# Patient Record
Sex: FEMALE | Race: ASIAN | NOT HISPANIC OR LATINO | Employment: FULL TIME | URBAN - METROPOLITAN AREA
[De-identification: names, ages, dates, MRNs, and addresses within clinical notes are randomized per-mention and may not be internally consistent; named-entity substitution may affect disease eponyms.]

---

## 2016-06-08 LAB
EXTERNAL HEPATITIS B SURFACE ANTIGEN: NEGATIVE
EXTERNAL RUBELLA IGG QUANTITATION: NORMAL
EXTERNAL SYPHILIS RPR SCREEN: NORMAL

## 2017-01-14 ENCOUNTER — APPOINTMENT (OUTPATIENT)
Dept: LAB | Facility: HOSPITAL | Age: 37
End: 2017-01-14
Attending: OBSTETRICS & GYNECOLOGY
Payer: COMMERCIAL

## 2017-01-14 ENCOUNTER — GENERIC CONVERSION - ENCOUNTER (OUTPATIENT)
Dept: OTHER | Facility: OTHER | Age: 37
End: 2017-01-14

## 2017-01-14 ENCOUNTER — TRANSCRIBE ORDERS (OUTPATIENT)
Dept: LAB | Facility: HOSPITAL | Age: 37
End: 2017-01-14

## 2017-01-14 DIAGNOSIS — Z34.02 ENCOUNTER FOR SUPERVISION OF NORMAL FIRST PREGNANCY IN SECOND TRIMESTER: ICD-10-CM

## 2017-01-14 DIAGNOSIS — Z34.82 ENCOUNTER FOR SUPERVISION OF OTHER NORMAL PREGNANCY, SECOND TRIMESTER: ICD-10-CM

## 2017-01-14 LAB
BASOPHILS # BLD AUTO: 0.01 THOUSANDS/ΜL (ref 0–0.1)
BASOPHILS NFR BLD AUTO: 0 % (ref 0–1)
EOSINOPHIL # BLD AUTO: 0.06 THOUSAND/ΜL (ref 0–0.61)
EOSINOPHIL NFR BLD AUTO: 1 % (ref 0–6)
ERYTHROCYTE [DISTWIDTH] IN BLOOD BY AUTOMATED COUNT: 14.1 % (ref 11.6–15.1)
GLUCOSE 1H P 50 G GLC PO SERPL-MCNC: 175 MG/DL
HCT VFR BLD AUTO: 34.4 % (ref 34.8–46.1)
HGB BLD-MCNC: 11.3 G/DL (ref 11.5–15.4)
LYMPHOCYTES # BLD AUTO: 1.33 THOUSANDS/ΜL (ref 0.6–4.47)
LYMPHOCYTES NFR BLD AUTO: 18 % (ref 14–44)
MCH RBC QN AUTO: 30.7 PG (ref 26.8–34.3)
MCHC RBC AUTO-ENTMCNC: 32.8 G/DL (ref 31.4–37.4)
MCV RBC AUTO: 94 FL (ref 82–98)
MONOCYTES # BLD AUTO: 0.26 THOUSAND/ΜL (ref 0.17–1.22)
MONOCYTES NFR BLD AUTO: 4 % (ref 4–12)
NEUTROPHILS # BLD AUTO: 5.58 THOUSANDS/ΜL (ref 1.85–7.62)
NEUTS SEG NFR BLD AUTO: 77 % (ref 43–75)
NRBC BLD AUTO-RTO: 0 /100 WBCS
PLATELET # BLD AUTO: 189 THOUSANDS/UL (ref 149–390)
PMV BLD AUTO: 10.9 FL (ref 8.9–12.7)
RBC # BLD AUTO: 3.68 MILLION/UL (ref 3.81–5.12)
WBC # BLD AUTO: 7.28 THOUSAND/UL (ref 4.31–10.16)

## 2017-01-14 PROCEDURE — 87389 HIV-1 AG W/HIV-1&-2 AB AG IA: CPT

## 2017-01-14 PROCEDURE — 85025 COMPLETE CBC W/AUTO DIFF WBC: CPT

## 2017-01-14 PROCEDURE — 36415 COLL VENOUS BLD VENIPUNCTURE: CPT

## 2017-01-14 PROCEDURE — 86592 SYPHILIS TEST NON-TREP QUAL: CPT

## 2017-01-14 PROCEDURE — 82950 GLUCOSE TEST: CPT

## 2017-01-16 ENCOUNTER — GENERIC CONVERSION - ENCOUNTER (OUTPATIENT)
Dept: OTHER | Facility: OTHER | Age: 37
End: 2017-01-16

## 2017-01-16 LAB
HIV 1+2 AB+HIV1 P24 AG SERPL QL IA: NORMAL
RPR SER QL: NORMAL

## 2017-01-26 ENCOUNTER — GENERIC CONVERSION - ENCOUNTER (OUTPATIENT)
Dept: OTHER | Facility: OTHER | Age: 37
End: 2017-01-26

## 2017-01-26 ENCOUNTER — ALLSCRIPTS OFFICE VISIT (OUTPATIENT)
Dept: OTHER | Facility: OTHER | Age: 37
End: 2017-01-26

## 2017-01-28 ENCOUNTER — APPOINTMENT (OUTPATIENT)
Dept: LAB | Facility: HOSPITAL | Age: 37
End: 2017-01-28
Attending: OBSTETRICS & GYNECOLOGY
Payer: COMMERCIAL

## 2017-01-28 DIAGNOSIS — O99.810 ABNORMAL GLUCOSE COMPLICATING PREGNANCY: ICD-10-CM

## 2017-01-28 LAB
GLUCOSE 1H P 100 G GLC PO SERPL-MCNC: 197 MG/DL (ref 65–179)
GLUCOSE 2H P 100 G GLC PO SERPL-MCNC: 138 MG/DL (ref 65–154)
GLUCOSE 3H P 100 G GLC PO SERPL-MCNC: 143 MG/DL (ref 65–139)
GLUCOSE P FAST SERPL-MCNC: 96 MG/DL (ref 65–99)

## 2017-01-28 PROCEDURE — 36415 COLL VENOUS BLD VENIPUNCTURE: CPT

## 2017-01-28 PROCEDURE — 82951 GLUCOSE TOLERANCE TEST (GTT): CPT

## 2017-01-28 PROCEDURE — 82948 REAGENT STRIP/BLOOD GLUCOSE: CPT

## 2017-01-28 PROCEDURE — 82952 GTT-ADDED SAMPLES: CPT

## 2017-01-29 ENCOUNTER — GENERIC CONVERSION - ENCOUNTER (OUTPATIENT)
Dept: OTHER | Facility: OTHER | Age: 37
End: 2017-01-29

## 2017-01-30 LAB — GLUCOSE SERPL-MCNC: 89 MG/DL (ref 65–140)

## 2017-02-13 ENCOUNTER — GENERIC CONVERSION - ENCOUNTER (OUTPATIENT)
Dept: OTHER | Facility: OTHER | Age: 37
End: 2017-02-13

## 2017-02-16 ENCOUNTER — GENERIC CONVERSION - ENCOUNTER (OUTPATIENT)
Dept: OTHER | Facility: OTHER | Age: 37
End: 2017-02-16

## 2017-02-16 ENCOUNTER — APPOINTMENT (OUTPATIENT)
Dept: PERINATAL CARE | Facility: CLINIC | Age: 37
End: 2017-02-16
Payer: COMMERCIAL

## 2017-02-16 PROCEDURE — G0108 DIAB MANAGE TRN  PER INDIV: HCPCS | Performed by: OBSTETRICS & GYNECOLOGY

## 2017-02-23 ENCOUNTER — GENERIC CONVERSION - ENCOUNTER (OUTPATIENT)
Dept: OTHER | Facility: OTHER | Age: 37
End: 2017-02-23

## 2017-02-23 ENCOUNTER — APPOINTMENT (OUTPATIENT)
Dept: PERINATAL CARE | Facility: CLINIC | Age: 37
End: 2017-02-23
Payer: COMMERCIAL

## 2017-02-23 PROCEDURE — G0108 DIAB MANAGE TRN  PER INDIV: HCPCS | Performed by: OBSTETRICS & GYNECOLOGY

## 2017-02-27 ENCOUNTER — GENERIC CONVERSION - ENCOUNTER (OUTPATIENT)
Dept: OTHER | Facility: OTHER | Age: 37
End: 2017-02-27

## 2017-02-28 ENCOUNTER — LAB CONVERSION - ENCOUNTER (OUTPATIENT)
Dept: OTHER | Facility: OTHER | Age: 37
End: 2017-02-28

## 2017-02-28 ENCOUNTER — ALLSCRIPTS OFFICE VISIT (OUTPATIENT)
Dept: PERINATAL CARE | Facility: CLINIC | Age: 37
End: 2017-02-28
Payer: COMMERCIAL

## 2017-02-28 ENCOUNTER — APPOINTMENT (OUTPATIENT)
Dept: PERINATAL CARE | Facility: CLINIC | Age: 37
End: 2017-02-28
Payer: COMMERCIAL

## 2017-02-28 ENCOUNTER — GENERIC CONVERSION - ENCOUNTER (OUTPATIENT)
Dept: OTHER | Facility: OTHER | Age: 37
End: 2017-02-28

## 2017-02-28 DIAGNOSIS — O24.414 INSULIN CONTROLLED GESTATIONAL DIABETES MELLITUS DURING PREGNANCY: ICD-10-CM

## 2017-02-28 PROCEDURE — 59025 FETAL NON-STRESS TEST: CPT | Performed by: OBSTETRICS & GYNECOLOGY

## 2017-02-28 PROCEDURE — G0108 DIAB MANAGE TRN  PER INDIV: HCPCS | Performed by: OBSTETRICS & GYNECOLOGY

## 2017-02-28 PROCEDURE — 76816 OB US FOLLOW-UP PER FETUS: CPT | Performed by: OBSTETRICS & GYNECOLOGY

## 2017-03-01 ENCOUNTER — TRANSCRIBE ORDERS (OUTPATIENT)
Dept: LAB | Facility: HOSPITAL | Age: 37
End: 2017-03-01

## 2017-03-01 ENCOUNTER — APPOINTMENT (OUTPATIENT)
Dept: LAB | Facility: HOSPITAL | Age: 37
End: 2017-03-01
Payer: COMMERCIAL

## 2017-03-01 DIAGNOSIS — O24.419 GESTATIONAL DIABETES MELLITUS, CLASS A2: ICD-10-CM

## 2017-03-01 DIAGNOSIS — O24.419 GESTATIONAL DIABETES MELLITUS, CLASS A2: Primary | ICD-10-CM

## 2017-03-01 DIAGNOSIS — O24.414 INSULIN CONTROLLED GESTATIONAL DIABETES MELLITUS DURING PREGNANCY: ICD-10-CM

## 2017-03-01 LAB
ALBUMIN SERPL BCP-MCNC: 2.9 G/DL (ref 3.5–5)
ALP SERPL-CCNC: 81 U/L (ref 46–116)
ALT SERPL W P-5'-P-CCNC: 21 U/L (ref 12–78)
ANION GAP SERPL CALCULATED.3IONS-SCNC: 9 MMOL/L (ref 4–13)
AST SERPL W P-5'-P-CCNC: 15 U/L (ref 5–45)
BILIRUB SERPL-MCNC: 0.26 MG/DL (ref 0.2–1)
BUN SERPL-MCNC: 9 MG/DL (ref 5–25)
CALCIUM SERPL-MCNC: 9 MG/DL (ref 8.3–10.1)
CHLORIDE SERPL-SCNC: 104 MMOL/L (ref 100–108)
CO2 SERPL-SCNC: 24 MMOL/L (ref 21–32)
CREAT SERPL-MCNC: 0.55 MG/DL (ref 0.6–1.3)
EST. AVERAGE GLUCOSE BLD GHB EST-MCNC: 120 MG/DL
GFR SERPL CREATININE-BSD FRML MDRD: >60 ML/MIN/1.73SQ M
GLUCOSE SERPL-MCNC: 92 MG/DL (ref 65–140)
HBA1C MFR BLD: 5.8 % (ref 4.2–6.3)
POTASSIUM SERPL-SCNC: 3.6 MMOL/L (ref 3.5–5.3)
PROT SERPL-MCNC: 7 G/DL (ref 6.4–8.2)
SODIUM SERPL-SCNC: 137 MMOL/L (ref 136–145)

## 2017-03-01 PROCEDURE — 80053 COMPREHEN METABOLIC PANEL: CPT

## 2017-03-01 PROCEDURE — 83036 HEMOGLOBIN GLYCOSYLATED A1C: CPT

## 2017-03-01 PROCEDURE — 36415 COLL VENOUS BLD VENIPUNCTURE: CPT

## 2017-03-03 ENCOUNTER — ALLSCRIPTS OFFICE VISIT (OUTPATIENT)
Dept: PERINATAL CARE | Facility: CLINIC | Age: 37
End: 2017-03-03
Payer: COMMERCIAL

## 2017-03-03 PROCEDURE — 59025 FETAL NON-STRESS TEST: CPT | Performed by: OBSTETRICS & GYNECOLOGY

## 2017-03-07 ENCOUNTER — ALLSCRIPTS OFFICE VISIT (OUTPATIENT)
Dept: PERINATAL CARE | Facility: CLINIC | Age: 37
End: 2017-03-07
Payer: COMMERCIAL

## 2017-03-07 PROCEDURE — 59025 FETAL NON-STRESS TEST: CPT | Performed by: OBSTETRICS & GYNECOLOGY

## 2017-03-10 ENCOUNTER — GENERIC CONVERSION - ENCOUNTER (OUTPATIENT)
Dept: OTHER | Facility: OTHER | Age: 37
End: 2017-03-10

## 2017-03-10 ENCOUNTER — APPOINTMENT (OUTPATIENT)
Dept: PERINATAL CARE | Facility: CLINIC | Age: 37
End: 2017-03-10
Payer: COMMERCIAL

## 2017-03-10 ENCOUNTER — ALLSCRIPTS OFFICE VISIT (OUTPATIENT)
Dept: PERINATAL CARE | Facility: CLINIC | Age: 37
End: 2017-03-10
Payer: COMMERCIAL

## 2017-03-10 PROCEDURE — 59025 FETAL NON-STRESS TEST: CPT | Performed by: OBSTETRICS & GYNECOLOGY

## 2017-03-10 PROCEDURE — 76815 OB US LIMITED FETUS(S): CPT | Performed by: OBSTETRICS & GYNECOLOGY

## 2017-03-17 ENCOUNTER — ALLSCRIPTS OFFICE VISIT (OUTPATIENT)
Dept: PERINATAL CARE | Facility: CLINIC | Age: 37
End: 2017-03-17
Payer: COMMERCIAL

## 2017-03-17 ENCOUNTER — ALLSCRIPTS OFFICE VISIT (OUTPATIENT)
Dept: OTHER | Facility: OTHER | Age: 37
End: 2017-03-17

## 2017-03-17 ENCOUNTER — LAB REQUISITION (OUTPATIENT)
Dept: LAB | Facility: HOSPITAL | Age: 37
End: 2017-03-17
Payer: COMMERCIAL

## 2017-03-17 ENCOUNTER — GENERIC CONVERSION - ENCOUNTER (OUTPATIENT)
Dept: OTHER | Facility: OTHER | Age: 37
End: 2017-03-17

## 2017-03-17 DIAGNOSIS — Z3A.36 36 WEEKS GESTATION OF PREGNANCY: ICD-10-CM

## 2017-03-17 PROCEDURE — 76815 OB US LIMITED FETUS(S): CPT | Performed by: OBSTETRICS & GYNECOLOGY

## 2017-03-17 PROCEDURE — 87653 STREP B DNA AMP PROBE: CPT | Performed by: NURSE PRACTITIONER

## 2017-03-17 PROCEDURE — 59025 FETAL NON-STRESS TEST: CPT | Performed by: OBSTETRICS & GYNECOLOGY

## 2017-03-19 ENCOUNTER — HOSPITAL ENCOUNTER (OUTPATIENT)
Facility: HOSPITAL | Age: 37
Discharge: HOME | End: 2017-03-19
Attending: OBSTETRICS & GYNECOLOGY | Admitting: OBSTETRICS & GYNECOLOGY
Payer: COMMERCIAL

## 2017-03-19 VITALS
SYSTOLIC BLOOD PRESSURE: 118 MMHG | HEART RATE: 108 BPM | DIASTOLIC BLOOD PRESSURE: 64 MMHG | OXYGEN SATURATION: 100 % | TEMPERATURE: 98.5 F | RESPIRATION RATE: 18 BRPM

## 2017-03-19 DIAGNOSIS — Z3A.36 36 WEEKS GESTATION OF PREGNANCY: ICD-10-CM

## 2017-03-19 LAB — GP B STREP DNA SPEC QL NAA+PROBE: ABNORMAL

## 2017-03-19 PROCEDURE — 99214 OFFICE O/P EST MOD 30 MIN: CPT

## 2017-03-20 ENCOUNTER — HOSPITAL ENCOUNTER (EMERGENCY)
Facility: HOSPITAL | Age: 37
Discharge: STILL ADMITTED OR ADMINISTRATIVE | End: 2017-03-20
Attending: OBSTETRICS & GYNECOLOGY | Admitting: OBSTETRICS & GYNECOLOGY
Payer: COMMERCIAL

## 2017-03-20 VITALS
OXYGEN SATURATION: 100 % | SYSTOLIC BLOOD PRESSURE: 109 MMHG | HEART RATE: 89 BPM | DIASTOLIC BLOOD PRESSURE: 71 MMHG | RESPIRATION RATE: 22 BRPM | TEMPERATURE: 98 F

## 2017-03-20 PROBLEM — Z3A.37 37 WEEKS GESTATION OF PREGNANCY: Status: ACTIVE | Noted: 2017-03-20

## 2017-03-20 LAB
GLUCOSE SERPL-MCNC: 82 MG/DL (ref 65–140)
GLUCOSE SERPL-MCNC: 82 MG/DL (ref 65–140)

## 2017-03-20 PROCEDURE — 99213 OFFICE O/P EST LOW 20 MIN: CPT

## 2017-03-20 PROCEDURE — 82948 REAGENT STRIP/BLOOD GLUCOSE: CPT

## 2017-03-21 ENCOUNTER — GENERIC CONVERSION - ENCOUNTER (OUTPATIENT)
Dept: OTHER | Facility: OTHER | Age: 37
End: 2017-03-21

## 2017-03-23 ENCOUNTER — ALLSCRIPTS OFFICE VISIT (OUTPATIENT)
Dept: PERINATAL CARE | Facility: CLINIC | Age: 37
End: 2017-03-23
Payer: COMMERCIAL

## 2017-03-23 ENCOUNTER — GENERIC CONVERSION - ENCOUNTER (OUTPATIENT)
Dept: OTHER | Facility: OTHER | Age: 37
End: 2017-03-23

## 2017-03-23 PROCEDURE — 76815 OB US LIMITED FETUS(S): CPT | Performed by: OBSTETRICS & GYNECOLOGY

## 2017-03-23 PROCEDURE — 59025 FETAL NON-STRESS TEST: CPT | Performed by: OBSTETRICS & GYNECOLOGY

## 2017-03-28 ENCOUNTER — GENERIC CONVERSION - ENCOUNTER (OUTPATIENT)
Dept: OTHER | Facility: OTHER | Age: 37
End: 2017-03-28

## 2017-03-30 ENCOUNTER — GENERIC CONVERSION - ENCOUNTER (OUTPATIENT)
Dept: OTHER | Facility: OTHER | Age: 37
End: 2017-03-30

## 2017-03-30 ENCOUNTER — ALLSCRIPTS OFFICE VISIT (OUTPATIENT)
Dept: PERINATAL CARE | Facility: CLINIC | Age: 37
End: 2017-03-30
Payer: COMMERCIAL

## 2017-03-30 ENCOUNTER — APPOINTMENT (OUTPATIENT)
Dept: PERINATAL CARE | Facility: CLINIC | Age: 37
End: 2017-03-30
Payer: COMMERCIAL

## 2017-03-30 PROCEDURE — 76816 OB US FOLLOW-UP PER FETUS: CPT | Performed by: OBSTETRICS & GYNECOLOGY

## 2017-03-30 PROCEDURE — 59025 FETAL NON-STRESS TEST: CPT | Performed by: OBSTETRICS & GYNECOLOGY

## 2017-03-31 ENCOUNTER — ALLSCRIPTS OFFICE VISIT (OUTPATIENT)
Dept: OTHER | Facility: OTHER | Age: 37
End: 2017-03-31

## 2017-04-03 ENCOUNTER — HOSPITAL ENCOUNTER (INPATIENT)
Facility: HOSPITAL | Age: 37
LOS: 3 days | Discharge: HOME | End: 2017-04-06
Attending: OBSTETRICS & GYNECOLOGY | Admitting: OBSTETRICS & GYNECOLOGY
Payer: COMMERCIAL

## 2017-04-03 DIAGNOSIS — Z3A.39 39 WEEKS GESTATION OF PREGNANCY: ICD-10-CM

## 2017-04-03 LAB
ABO GROUP BLD: NORMAL
BASOPHILS # BLD AUTO: 0.02 THOUSANDS/ΜL (ref 0–0.1)
BASOPHILS NFR BLD AUTO: 0 % (ref 0–1)
BLD GP AB SCN SERPL QL: NEGATIVE
EOSINOPHIL # BLD AUTO: 0.04 THOUSAND/ΜL (ref 0–0.61)
EOSINOPHIL NFR BLD AUTO: 1 % (ref 0–6)
ERYTHROCYTE [DISTWIDTH] IN BLOOD BY AUTOMATED COUNT: 14.9 % (ref 11.6–15.1)
GLUCOSE SERPL-MCNC: 156 MG/DL (ref 65–140)
HCT VFR BLD AUTO: 34 % (ref 34.8–46.1)
HGB BLD-MCNC: 11.3 G/DL (ref 11.5–15.4)
LYMPHOCYTES # BLD AUTO: 1.4 THOUSANDS/ΜL (ref 0.6–4.47)
LYMPHOCYTES NFR BLD AUTO: 27 % (ref 14–44)
MCH RBC QN AUTO: 31 PG (ref 26.8–34.3)
MCHC RBC AUTO-ENTMCNC: 33.2 G/DL (ref 31.4–37.4)
MCV RBC AUTO: 93 FL (ref 82–98)
MONOCYTES # BLD AUTO: 0.22 THOUSAND/ΜL (ref 0.17–1.22)
MONOCYTES NFR BLD AUTO: 4 % (ref 4–12)
NEUTROPHILS # BLD AUTO: 3.51 THOUSANDS/ΜL (ref 1.85–7.62)
NEUTS SEG NFR BLD AUTO: 68 % (ref 43–75)
NRBC BLD AUTO-RTO: 0 /100 WBCS
PLATELET # BLD AUTO: 152 THOUSANDS/UL (ref 149–390)
PMV BLD AUTO: 11.9 FL (ref 8.9–12.7)
RBC # BLD AUTO: 3.64 MILLION/UL (ref 3.81–5.12)
RH BLD: POSITIVE
WBC # BLD AUTO: 5.2 THOUSAND/UL (ref 4.31–10.16)

## 2017-04-03 PROCEDURE — 82948 REAGENT STRIP/BLOOD GLUCOSE: CPT

## 2017-04-03 PROCEDURE — 85025 COMPLETE CBC W/AUTO DIFF WBC: CPT | Performed by: OBSTETRICS & GYNECOLOGY

## 2017-04-03 PROCEDURE — 86901 BLOOD TYPING SEROLOGIC RH(D): CPT | Performed by: OBSTETRICS & GYNECOLOGY

## 2017-04-03 PROCEDURE — 86850 RBC ANTIBODY SCREEN: CPT | Performed by: OBSTETRICS & GYNECOLOGY

## 2017-04-03 PROCEDURE — 86900 BLOOD TYPING SEROLOGIC ABO: CPT | Performed by: OBSTETRICS & GYNECOLOGY

## 2017-04-03 PROCEDURE — 86592 SYPHILIS TEST NON-TREP QUAL: CPT | Performed by: OBSTETRICS & GYNECOLOGY

## 2017-04-03 RX ORDER — ONDANSETRON 2 MG/ML
4 INJECTION INTRAMUSCULAR; INTRAVENOUS EVERY 6 HOURS PRN
Status: DISCONTINUED | OUTPATIENT
Start: 2017-04-03 | End: 2017-04-04

## 2017-04-03 RX ORDER — SODIUM CHLORIDE 9 MG/ML
125 INJECTION, SOLUTION INTRAVENOUS CONTINUOUS
Status: DISCONTINUED | OUTPATIENT
Start: 2017-04-03 | End: 2017-04-06 | Stop reason: HOSPADM

## 2017-04-03 RX ADMIN — INSULIN DETEMIR 14 UNITS: 100 INJECTION, SOLUTION SUBCUTANEOUS at 22:06

## 2017-04-04 ENCOUNTER — ANESTHESIA (INPATIENT)
Dept: LABOR AND DELIVERY | Facility: HOSPITAL | Age: 37
End: 2017-04-04
Payer: COMMERCIAL

## 2017-04-04 ENCOUNTER — ANESTHESIA EVENT (INPATIENT)
Dept: LABOR AND DELIVERY | Facility: HOSPITAL | Age: 37
End: 2017-04-04
Payer: COMMERCIAL

## 2017-04-04 PROBLEM — Z3A.39 39 WEEKS GESTATION OF PREGNANCY: Status: ACTIVE | Noted: 2017-04-04

## 2017-04-04 PROBLEM — O24.414 INSULIN CONTROLLED GESTATIONAL DIABETES MELLITUS (GDM) DURING PREGNANCY: Status: ACTIVE | Noted: 2017-04-04

## 2017-04-04 LAB
BASE EXCESS BLDCOA CALC-SCNC: -9.9 MMOL/L (ref 3–11)
BASE EXCESS BLDCOV CALC-SCNC: -5.6 MMOL/L (ref 1–9)
GLUCOSE SERPL-MCNC: 67 MG/DL (ref 65–140)
GLUCOSE SERPL-MCNC: 69 MG/DL (ref 65–140)
GLUCOSE SERPL-MCNC: 72 MG/DL (ref 65–140)
GLUCOSE SERPL-MCNC: 74 MG/DL (ref 65–140)
GLUCOSE SERPL-MCNC: 75 MG/DL (ref 65–140)
GLUCOSE SERPL-MCNC: 83 MG/DL (ref 65–140)
GLUCOSE SERPL-MCNC: 92 MG/DL (ref 65–140)
GLUCOSE SERPL-MCNC: 99 MG/DL (ref 65–140)
HCO3 BLDCOA-SCNC: 19.8 MMOL/L (ref 17.3–27.3)
HCO3 BLDCOV-SCNC: 21.3 MMOL/L (ref 12.2–28.6)
O2 CT VFR BLDCOA CALC: 14.7 ML/DL
OXYHGB MFR BLDCOA: 59 %
OXYHGB MFR BLDCOV: 43.9 %
PCO2 BLDCOA: 57.6 MM[HG] (ref 30–60)
PCO2 BLDCOV: 46.3 MM HG (ref 27–43)
PH BLDCOA: 7.15 [PH] (ref 7.23–7.43)
PH BLDCOV: 7.28 [PH] (ref 7.19–7.49)
PO2 BLDCOA: 29 MM HG (ref 5–25)
PO2 BLDCOV: 20.7 MM HG (ref 15–45)
RPR SER QL: NORMAL
SAO2 % BLDCOV: 10.4 ML/DL

## 2017-04-04 PROCEDURE — 82805 BLOOD GASES W/O2 SATURATION: CPT | Performed by: OBSTETRICS & GYNECOLOGY

## 2017-04-04 PROCEDURE — 0DQR0ZZ REPAIR ANAL SPHINCTER, OPEN APPROACH: ICD-10-PCS | Performed by: OBSTETRICS & GYNECOLOGY

## 2017-04-04 PROCEDURE — 0UQMXZZ REPAIR VULVA, EXTERNAL APPROACH: ICD-10-PCS | Performed by: OBSTETRICS & GYNECOLOGY

## 2017-04-04 PROCEDURE — 10907ZC DRAINAGE OF AMNIOTIC FLUID, THERAPEUTIC FROM PRODUCTS OF CONCEPTION, VIA NATURAL OR ARTIFICIAL OPENING: ICD-10-PCS | Performed by: OBSTETRICS & GYNECOLOGY

## 2017-04-04 PROCEDURE — 82948 REAGENT STRIP/BLOOD GLUCOSE: CPT

## 2017-04-04 PROCEDURE — 3E033VJ INTRODUCTION OF OTHER HORMONE INTO PERIPHERAL VEIN, PERCUTANEOUS APPROACH: ICD-10-PCS | Performed by: OBSTETRICS & GYNECOLOGY

## 2017-04-04 RX ORDER — ACETAMINOPHEN 325 MG/1
650 TABLET ORAL EVERY 6 HOURS PRN
Status: DISCONTINUED | OUTPATIENT
Start: 2017-04-04 | End: 2017-04-06 | Stop reason: HOSPADM

## 2017-04-04 RX ORDER — SIMETHICONE 80 MG
80 TABLET,CHEWABLE ORAL 4 TIMES DAILY PRN
Status: DISCONTINUED | OUTPATIENT
Start: 2017-04-04 | End: 2017-04-06 | Stop reason: HOSPADM

## 2017-04-04 RX ORDER — DIAPER,BRIEF,INFANT-TODD,DISP
1 EACH MISCELLANEOUS AS NEEDED
Status: DISCONTINUED | OUTPATIENT
Start: 2017-04-04 | End: 2017-04-06 | Stop reason: HOSPADM

## 2017-04-04 RX ORDER — DIPHENHYDRAMINE HYDROCHLORIDE 50 MG/ML
25 INJECTION INTRAMUSCULAR; INTRAVENOUS EVERY 6 HOURS PRN
Status: DISCONTINUED | OUTPATIENT
Start: 2017-04-04 | End: 2017-04-04

## 2017-04-04 RX ORDER — METOCLOPRAMIDE HYDROCHLORIDE 5 MG/ML
5 INJECTION INTRAMUSCULAR; INTRAVENOUS EVERY 6 HOURS PRN
Status: DISCONTINUED | OUTPATIENT
Start: 2017-04-04 | End: 2017-04-04

## 2017-04-04 RX ORDER — BISACODYL 10 MG
10 SUPPOSITORY, RECTAL RECTAL AS NEEDED
Status: DISCONTINUED | OUTPATIENT
Start: 2017-04-04 | End: 2017-04-06 | Stop reason: HOSPADM

## 2017-04-04 RX ORDER — DOCUSATE SODIUM 100 MG/1
100 CAPSULE, LIQUID FILLED ORAL 2 TIMES DAILY
Status: DISCONTINUED | OUTPATIENT
Start: 2017-04-04 | End: 2017-04-06 | Stop reason: HOSPADM

## 2017-04-04 RX ORDER — METOCLOPRAMIDE HYDROCHLORIDE 5 MG/ML
10 INJECTION INTRAMUSCULAR; INTRAVENOUS EVERY 6 HOURS PRN
Status: DISCONTINUED | OUTPATIENT
Start: 2017-04-04 | End: 2017-04-06 | Stop reason: HOSPADM

## 2017-04-04 RX ORDER — ZOLPIDEM TARTRATE 5 MG/1
TABLET ORAL
Status: DISCONTINUED
Start: 2017-04-04 | End: 2017-04-04 | Stop reason: WASHOUT

## 2017-04-04 RX ORDER — DIPHENHYDRAMINE HCL 25 MG
25 TABLET ORAL EVERY 6 HOURS PRN
Status: DISCONTINUED | OUTPATIENT
Start: 2017-04-04 | End: 2017-04-06 | Stop reason: HOSPADM

## 2017-04-04 RX ORDER — BUTORPHANOL TARTRATE 1 MG/ML
1 INJECTION, SOLUTION INTRAMUSCULAR; INTRAVENOUS ONCE
Status: COMPLETED | OUTPATIENT
Start: 2017-04-04 | End: 2017-04-04

## 2017-04-04 RX ORDER — OXYTOCIN/RINGER'S LACTATE 30/500 ML
PLASTIC BAG, INJECTION (ML) INTRAVENOUS
Status: COMPLETED
Start: 2017-04-04 | End: 2017-04-04

## 2017-04-04 RX ORDER — FENTANYL CITRATE 50 UG/ML
INJECTION, SOLUTION INTRAMUSCULAR; INTRAVENOUS
Status: COMPLETED
Start: 2017-04-04 | End: 2017-04-04

## 2017-04-04 RX ORDER — IBUPROFEN 600 MG/1
600 TABLET ORAL EVERY 6 HOURS PRN
Status: DISCONTINUED | OUTPATIENT
Start: 2017-04-04 | End: 2017-04-06 | Stop reason: HOSPADM

## 2017-04-04 RX ORDER — BUPIVACAINE HYDROCHLORIDE 2.5 MG/ML
INJECTION, SOLUTION INFILTRATION; PERINEURAL AS NEEDED
Status: DISCONTINUED | OUTPATIENT
Start: 2017-04-04 | End: 2017-04-04 | Stop reason: SURG

## 2017-04-04 RX ORDER — ZOLPIDEM TARTRATE 5 MG/1
5 TABLET ORAL ONCE
Status: COMPLETED | OUTPATIENT
Start: 2017-04-04 | End: 2017-04-04

## 2017-04-04 RX ORDER — BUTORPHANOL TARTRATE 1 MG/ML
INJECTION, SOLUTION INTRAMUSCULAR; INTRAVENOUS
Status: COMPLETED
Start: 2017-04-04 | End: 2017-04-04

## 2017-04-04 RX ORDER — OXYTOCIN/RINGER'S LACTATE 30/500 ML
1-30 PLASTIC BAG, INJECTION (ML) INTRAVENOUS
Status: DISCONTINUED | OUTPATIENT
Start: 2017-04-04 | End: 2017-04-06 | Stop reason: HOSPADM

## 2017-04-04 RX ORDER — ONDANSETRON 2 MG/ML
4 INJECTION INTRAMUSCULAR; INTRAVENOUS EVERY 4 HOURS PRN
Status: DISCONTINUED | OUTPATIENT
Start: 2017-04-04 | End: 2017-04-04

## 2017-04-04 RX ADMIN — ZOLPIDEM TARTRATE 5 MG: 5 TABLET, FILM COATED ORAL at 01:53

## 2017-04-04 RX ADMIN — BUTORPHANOL TARTRATE 1 MG: 1 INJECTION, SOLUTION INTRAMUSCULAR; INTRAVENOUS at 11:46

## 2017-04-04 RX ADMIN — Medication: at 13:31

## 2017-04-04 RX ADMIN — SODIUM CHLORIDE 125 ML/HR: 0.9 INJECTION, SOLUTION INTRAVENOUS at 11:48

## 2017-04-04 RX ADMIN — BUPIVACAINE HYDROCHLORIDE 1 ML: 2.5 INJECTION, SOLUTION INFILTRATION; PERINEURAL at 13:28

## 2017-04-04 RX ADMIN — SODIUM CHLORIDE 5 MILLION UNITS: 0.9 INJECTION, SOLUTION INTRAVENOUS at 05:46

## 2017-04-04 RX ADMIN — FENTANYL CITRATE 10 MCG: 50 INJECTION INTRAMUSCULAR; INTRAVENOUS at 13:28

## 2017-04-04 RX ADMIN — Medication 2 MILLI-UNITS/MIN: at 06:00

## 2017-04-04 RX ADMIN — SODIUM CHLORIDE 2.5 MILLION UNITS: 9 INJECTION, SOLUTION INTRAVENOUS at 13:49

## 2017-04-04 RX ADMIN — Medication 8 ML/HR: at 13:30

## 2017-04-04 RX ADMIN — BENZOCAINE AND MENTHOL 1 APPLICATION: 20; .5 SPRAY TOPICAL at 20:06

## 2017-04-04 RX ADMIN — SODIUM CHLORIDE 125 ML/HR: 0.9 INJECTION, SOLUTION INTRAVENOUS at 05:45

## 2017-04-04 RX ADMIN — SODIUM CHLORIDE 2.5 MILLION UNITS: 9 INJECTION, SOLUTION INTRAVENOUS at 09:55

## 2017-04-05 LAB
BASOPHILS # BLD AUTO: 0.01 THOUSANDS/ΜL (ref 0–0.1)
BASOPHILS NFR BLD AUTO: 0 % (ref 0–1)
EOSINOPHIL # BLD AUTO: 0 THOUSAND/ΜL (ref 0–0.61)
EOSINOPHIL NFR BLD AUTO: 0 % (ref 0–6)
ERYTHROCYTE [DISTWIDTH] IN BLOOD BY AUTOMATED COUNT: 14.9 % (ref 11.6–15.1)
GLUCOSE SERPL-MCNC: 110 MG/DL (ref 65–140)
GLUCOSE SERPL-MCNC: 124 MG/DL (ref 65–140)
GLUCOSE SERPL-MCNC: 133 MG/DL (ref 65–140)
GLUCOSE SERPL-MCNC: 135 MG/DL (ref 65–140)
HCT VFR BLD AUTO: 26.5 % (ref 34.8–46.1)
HGB BLD-MCNC: 9.1 G/DL (ref 11.5–15.4)
LYMPHOCYTES # BLD AUTO: 1.09 THOUSANDS/ΜL (ref 0.6–4.47)
LYMPHOCYTES NFR BLD AUTO: 10 % (ref 14–44)
MCH RBC QN AUTO: 31.8 PG (ref 26.8–34.3)
MCHC RBC AUTO-ENTMCNC: 34.3 G/DL (ref 31.4–37.4)
MCV RBC AUTO: 93 FL (ref 82–98)
MONOCYTES # BLD AUTO: 0.39 THOUSAND/ΜL (ref 0.17–1.22)
MONOCYTES NFR BLD AUTO: 3 % (ref 4–12)
NEUTROPHILS # BLD AUTO: 9.9 THOUSANDS/ΜL (ref 1.85–7.62)
NEUTS SEG NFR BLD AUTO: 87 % (ref 43–75)
NRBC BLD AUTO-RTO: 0 /100 WBCS
PLATELET # BLD AUTO: 125 THOUSANDS/UL (ref 149–390)
PMV BLD AUTO: 11.3 FL (ref 8.9–12.7)
RBC # BLD AUTO: 2.86 MILLION/UL (ref 3.81–5.12)
WBC # BLD AUTO: 11.41 THOUSAND/UL (ref 4.31–10.16)

## 2017-04-05 PROCEDURE — 85025 COMPLETE CBC W/AUTO DIFF WBC: CPT | Performed by: OBSTETRICS & GYNECOLOGY

## 2017-04-05 PROCEDURE — 82948 REAGENT STRIP/BLOOD GLUCOSE: CPT

## 2017-04-05 RX ADMIN — DOCUSATE SODIUM 100 MG: 100 CAPSULE, LIQUID FILLED ORAL at 08:09

## 2017-04-05 RX ADMIN — CEFAZOLIN SODIUM 2000 MG: 2 SOLUTION INTRAVENOUS at 09:04

## 2017-04-05 RX ADMIN — IBUPROFEN 600 MG: 600 TABLET, FILM COATED ORAL at 08:09

## 2017-04-05 RX ADMIN — CEFAZOLIN SODIUM 2000 MG: 2 SOLUTION INTRAVENOUS at 02:07

## 2017-04-05 RX ADMIN — DOCUSATE SODIUM 100 MG: 100 CAPSULE, LIQUID FILLED ORAL at 17:09

## 2017-04-05 RX ADMIN — WITCH HAZEL 1 PAD: 500 SOLUTION RECTAL; TOPICAL at 23:07

## 2017-04-05 RX ADMIN — CEFAZOLIN SODIUM 2000 MG: 2 SOLUTION INTRAVENOUS at 17:09

## 2017-04-05 RX ADMIN — HYDROCORTISONE 1 APPLICATION: 10 CREAM TOPICAL at 23:08

## 2017-04-06 VITALS
HEART RATE: 85 BPM | BODY MASS INDEX: 30.61 KG/M2 | RESPIRATION RATE: 20 BRPM | DIASTOLIC BLOOD PRESSURE: 64 MMHG | SYSTOLIC BLOOD PRESSURE: 120 MMHG | HEIGHT: 67 IN | WEIGHT: 195 LBS | TEMPERATURE: 98.4 F | OXYGEN SATURATION: 98 %

## 2017-04-06 RX ORDER — IBUPROFEN 600 MG/1
600 TABLET ORAL EVERY 6 HOURS PRN
Qty: 30 TABLET | Refills: 0
Start: 2017-04-06 | End: 2017-05-06

## 2017-04-06 RX ORDER — DIAPER,BRIEF,INFANT-TODD,DISP
1 EACH MISCELLANEOUS AS NEEDED
Qty: 30 G | Refills: 0
Start: 2017-04-06 | End: 2017-05-06

## 2017-04-06 RX ORDER — ACETAMINOPHEN 325 MG/1
TABLET ORAL
Qty: 30 TABLET | Refills: 0
Start: 2017-04-06

## 2017-04-06 RX ORDER — DOCUSATE SODIUM 100 MG/1
100 CAPSULE, LIQUID FILLED ORAL 2 TIMES DAILY
Qty: 60 CAPSULE | Refills: 0
Start: 2017-04-06 | End: 2017-05-06

## 2017-04-12 LAB — PLACENTA IN STORAGE: NORMAL

## 2017-04-28 ENCOUNTER — GENERIC CONVERSION - ENCOUNTER (OUTPATIENT)
Dept: OTHER | Facility: OTHER | Age: 37
End: 2017-04-28

## 2017-04-28 DIAGNOSIS — O24.419 GESTATIONAL DIABETES MELLITUS IN PREGNANCY: ICD-10-CM

## 2017-04-29 ENCOUNTER — APPOINTMENT (OUTPATIENT)
Dept: LAB | Facility: HOSPITAL | Age: 37
End: 2017-04-29
Attending: OBSTETRICS & GYNECOLOGY
Payer: COMMERCIAL

## 2017-04-29 ENCOUNTER — TRANSCRIBE ORDERS (OUTPATIENT)
Dept: LAB | Facility: HOSPITAL | Age: 37
End: 2017-04-29

## 2017-04-29 DIAGNOSIS — O24.419 GESTATIONAL DIABETES MELLITUS IN PREGNANCY: ICD-10-CM

## 2017-04-29 LAB
ERYTHROCYTE [DISTWIDTH] IN BLOOD BY AUTOMATED COUNT: 13.8 % (ref 11.6–15.1)
HCT VFR BLD AUTO: 37.8 % (ref 34.8–46.1)
HGB BLD-MCNC: 12.1 G/DL (ref 11.5–15.4)
MCH RBC QN AUTO: 30.3 PG (ref 26.8–34.3)
MCHC RBC AUTO-ENTMCNC: 32 G/DL (ref 31.4–37.4)
MCV RBC AUTO: 95 FL (ref 82–98)
PLATELET # BLD AUTO: 242 THOUSANDS/UL (ref 149–390)
PMV BLD AUTO: 10.8 FL (ref 8.9–12.7)
RBC # BLD AUTO: 3.99 MILLION/UL (ref 3.81–5.12)
WBC # BLD AUTO: 4.9 THOUSAND/UL (ref 4.31–10.16)

## 2017-04-29 PROCEDURE — 36415 COLL VENOUS BLD VENIPUNCTURE: CPT

## 2017-04-29 PROCEDURE — 85027 COMPLETE CBC AUTOMATED: CPT

## 2017-05-01 ENCOUNTER — GENERIC CONVERSION - ENCOUNTER (OUTPATIENT)
Dept: OTHER | Facility: OTHER | Age: 37
End: 2017-05-01

## 2017-05-20 ENCOUNTER — APPOINTMENT (OUTPATIENT)
Dept: LAB | Facility: HOSPITAL | Age: 37
End: 2017-05-20
Attending: OBSTETRICS & GYNECOLOGY
Payer: COMMERCIAL

## 2017-05-20 DIAGNOSIS — O24.419 GESTATIONAL DIABETES MELLITUS IN PREGNANCY: ICD-10-CM

## 2017-05-20 LAB
GLUCOSE 2H P 75 G GLC PO SERPL-MCNC: 118 MG/DL (ref 70–183)
GLUCOSE P FAST SERPL-MCNC: 86 MG/DL (ref 65–99)

## 2017-05-20 PROCEDURE — 82948 REAGENT STRIP/BLOOD GLUCOSE: CPT

## 2017-05-20 PROCEDURE — 82947 ASSAY GLUCOSE BLOOD QUANT: CPT

## 2017-05-20 PROCEDURE — 36415 COLL VENOUS BLD VENIPUNCTURE: CPT

## 2017-05-20 PROCEDURE — 82950 GLUCOSE TEST: CPT

## 2017-05-21 ENCOUNTER — GENERIC CONVERSION - ENCOUNTER (OUTPATIENT)
Dept: OTHER | Facility: OTHER | Age: 37
End: 2017-05-21

## 2017-05-23 ENCOUNTER — ALLSCRIPTS OFFICE VISIT (OUTPATIENT)
Dept: OTHER | Facility: OTHER | Age: 37
End: 2017-05-23

## 2017-05-23 LAB — GLUCOSE SERPL-MCNC: 80 MG/DL (ref 65–140)

## 2017-05-24 ENCOUNTER — GENERIC CONVERSION - ENCOUNTER (OUTPATIENT)
Dept: OTHER | Facility: OTHER | Age: 37
End: 2017-05-24

## 2018-01-09 NOTE — PROGRESS NOTES
OCT 5 2016         RE: Manueljaki Farnsworth                                        To: JOHNNY Thompson    MR#: [id number]                                  [address]   : [address]: [id number]:ADILENE MarcanoPhilipmichelle U  6    (Exam #: [id number])                           Fax: [phone]      The LMP of this 31year old,  G1, P0-0-0-0 patient was 2016, giving   her an SALVATORE of APR 10 2017 and a current gestational age of 17 weeks 2 days   by dates  A sonographic examination was performed on OCT 5 2016 using real   time equipment  The ultrasound examination was performed using abdominal   technique  The patient has a BMI of 28 3  Her blood pressure today was   111/68  Earliest ultrasound found in her record: 16   9w2d  4/15/17 SALVATORE      Thank you very much for your kind referral of Manuel Farnsworth to the [facility] in [place] on 2016 for first arrester ultrasound   evaluation, genetic screening, and  assessment  Jimmy Mendiola is a   24-year-old primigravida who is currently at 13-2/7 weeks gestation by an   estimated due date of April 10, 2017  She conceived this pregnancy   following intrauterine insemination  With the exception of fatigue, she   has no complaints today  Jimmy Mendiola denies vaginal bleeding during the pregnancy  She has has not yet received the influenza vaccine during the pregnancy  Jimmy Mendiola has unremarkable past medical and surgical histories  She currently   takes no medication with the exception of a prenatal vitamin on a daily   basis and has no known drug allergy  She denies tobacco, alcohol, or   illicit drug use during the pregnancy  Her dad has diabetes and   hypertension  There is no other first degree family member with a   diagnosis of diabetes, hypertension, or venous thromboembolism   The family   genetic history is negative with respect to genetic abnormalities, birth defects, or mental retardation  Multiple longitudinal and transverse sections revealed a thorne   intrauterine pregnancy with the fetus in variable presentation  The   placenta is posterior in implantation  Cardiac motion was observed at 159 bpm       INDICATIONS      first trimester genetic screening      Exam Types      sequential screen      RESULTS      Fetus # 1 of 1   Variable presentation   Fetal growth appeared normal      MEASUREMENTS (* Included In Average GA)      CRL              6 6 cm        12 weeks 5 days*   Nuchal Trans    1 60 mm      THE AVERAGE GESTATIONAL AGE is 12 weeks 5 days +/- 7 days  UTERINE ARTERIES                                  S/D   PI    RI    NOTCH       Left Uterine Artery              1 46         No       Right Uterine Artery             1 49         No      ANATOMY COMMENTS      Anatomic detail is limited at this gestational age  The yolk sac was not   noted  The fetal cranium appeared normal in shape and the nuchal   translucency was normal in size at 1 6 mm  The nasal bone appears to be   present  The intracranial anatomy was unremarkable  The spine is   suboptimally imaged  Anatomy of the fetal thorax appeared within normal   limits  The cardiac rhythm was regular  Within the abdomen, stomach &   bladder were visualized and the abdominal wall appeared intact  A three   vessel cord appears to be present  Active movement of the fetal body &   extremities was seen  There is no suspicion of a subchorionic bleed  The   placental cord insertion was normal    The uterine artery Dopplers are   normal  for this gestational age  There is no suspicion of a uterine   myoma  Free fluid is not seen in the posterior cul-de-sac  ADNEXA      The left ovary was not visualized   The right ovary appeared normal and   measured 4 4 x 1 8 x 2 4 cm with a volume of 9 9 cc       AMNIOTIC FLUID         Largest Vertical Pocket = 4 0 cm   Amniotic Fluid: Normal IMPRESSION      Renteria IUP   12 weeks and 5 days by this ultrasound  (SALVATORE=APR 14 2017)   Variable presentation   Fetal growth appeared normal   Regular fetal heart rate of 159 bpm   Posterior placenta      GENERAL COMMENT      Today's ultrasound findings and suggested follow-up were discussed in   detail with Greek  The Sequential Screen was discussed in detail, including   the sensitivities for detection of Down syndrome, Trisomy 18, and open   neural tube defects  Definitive prenatal diagnosis is possible only   through genetic amniocentesis or CVS   Greek underwent fingerstick blood   collection for hCG and HEATHER-A to complete the initial component of the   Sequential Screen  Results should be available within one week  Follow-up multiple marker serum screening at 16-18 weeks gestation is   recommended to complete the Sequential Screen  Fetal Level II ultrasound   imaging is scheduled at about 20 weeks gestation  We discussed the   importance of receiving the influenza vaccine during pregnancy which was   offered to Krystian, but which she refused  The face to face time, in addition to time spent discussing ultrasound   results, was 10 approximately  minutes, greater than 50% of which was   spent during counseling and coordination of care  RANDI Christensen M D     Maternal-Fetal Medicine   Electronically signed 10/05/16 17:55

## 2018-01-10 NOTE — RESULT NOTES
Verified Results  (1) GLUCOSE TOLERANCE TEST, 2HR [id number] 08:58AM Kevin Tatein Order Number: [id number]     Test Name Result Flag Reference   GLUCOSE TOLERANCE FASTING 86 mg/dL  65-99   65-99 Fasting Normal Glucose  100-125 Impaired fasting glucose  >=126 Diagnosis Diabetes Mellitus   GLUCOSE TOLERANCE 2 HOUR SCREEN 118 mg/dL     <140 Normal Glucose  140-199 Impaired fasting glucose  >=200 Diagnosis Diabetes Mellitus

## 2018-01-10 NOTE — PROGRESS NOTES
MAR 30 2017         RE: Pattie Query                                        To: JOHNNY Alfonso    MR#: [id number]                                  [address]   : [address]: [id number]                             Jag Marcano U  6    (Exam #: [id number])                           Fax: [phone]      The LMP of this 31year old,  G1, P0-0-0-0 patient was 2016, giving   her an SALVATORE of APR 10 2017 and a current gestational age of 37 weeks 3 days   by dates  A sonographic examination was performed on MAR 30 2017 using   real time equipment  The ultrasound examination was performed using   abdominal technique  The patient has a BMI of 30 7  Her blood pressure   today was 102/70  Earliest ultrasound found in her record: 16   9w2d  4/15/17 SALVATORE      Cardiac motion was observed at 162 bpm       INDICATIONS      diabetes, gestational, class A2   Interval growth assesment      Exam Types      Level I      RESULTS      Fetus # 1 of 1   Vertex presentation   Fetal growth appeared normal   Placenta Location = Posterior   No placenta previa   Placenta Grade = II      The NST was reactive with no decelerations  MEASUREMENTS (* Included In Average GA)      AC              32 9 cm        37 weeks 0 days* (32%)   BPD              9 6 cm        39 weeks 3 days* (80%)   HC              34 9 cm        40 weeks 0 days* (72%)   Femur            7 3 cm        36 weeks 4 days* (36%)      Cerebellum       5 1 cm        36 weeks 4 days      HC/AC           1 06   FL/AC           0 22   FL/BPD          0 75   EFW (Ac/Fl/Hc)  3219 grams - 7 lbs 2 oz                 (43%)      THE AVERAGE GESTATIONAL AGE is 38 weeks 2 days +/- 21 days        AMNIOTIC FLUID      Q1: 2 6      Q2: 3 1      Q3: 3 6      Q4: 4 3   PERLITA Total = 13 6 cm   Amniotic Fluid: Normal      ANATOMY DETAILS      Visualized Appearing Sonographically Normal:   HEAD: (Calvarium, BPD Level, Cavum, Lateral Ventricles, Choroid Plexus,   Cerebellum, Cisterna Magna);    TH  CAV  : (Lungs, Diaphragm);    STOMACH,   RIGHT KIDNEY, BLADDER, PLACENTA      Abnormal:   LEFT KIDNEY      ANATOMY COMMENTS      Left pyelectasis seen at 7 4 mm  Bladder full on several occasions  No   hydronephrosis is seen  IMPRESSION      Renteria IUP   38 weeks and 2 days by this ultrasound  (SALVATORE=2017)   Vertex presentation   Fetal growth appeared normal   Regular fetal heart rate of 162 bpm   Pyelectasis   Posterior placenta   No placenta previa      GENERAL COMMENT      The patient was informed of the findings and counseled about the   limitations of the exam in detecting all forms of fetal congenital   abnormalities  She denies any vaginal bleeding or uterine cramping/contractions  She does   feel fetal movement  Her blood sugars are well controlled on Levemir 28   units at nighttime with fastings less than 88 and 2 hour postprandials   less than 120  Exam shows she is comfortable and her abdomen is non tender  Follow up recommended:   1  Twice weekly nst and weekly perlita till delivery  Can offer delivery   anytime after 39 weeks  2  I suspect her newly found minimal left sided pyelectasis is a false +   from her full bladder  Will review with her next PERLITA if undelivered  Otherwise recommend a  US 72 hrs to 2 weeks after delivery  3  Recommend taking only 1/2 of her insulin the night prior to her    induction  Then recommend she resume a normal diet post delivery and she   will need a 2 hour OGTT planned for 6 weeks postpartum to prove her   gestational diabetes has resolved  The face to face time, in addition to time spent discussing ultrasound   results, was approximately 15 minutes, greater than 50% of which was spent   during counseling and coordination of care  RANDI Wade M D     Maternal-Fetal Medicine Electronically signed 03/30/17 21:52           Electronically signed by:Lele GAUTHIER    Mar 31 2017  2:23PM EST

## 2018-01-11 NOTE — RESULT NOTES
Verified Results  (1) GLUCOSE TOLERANCE TEST, 3HR 22Nov2016 07:36AM Marvin Roper    Order Number: [id number]     Test Name Result Flag Reference   GLUCOSE TOLERANCE FASTING 93 mg/dL  65-99   GLUCOSE 1 HOUR 100 GM GLUC CHALLENGE-PREG  mg/dL     GLUCOSE 2 HOUR 100 GM GLUC CHALLENGE-PREG  mg/dL     GLUCOSE, 3HR (100gm Gluc Challenge Preg-Pts) 102 mg/dL

## 2018-01-11 NOTE — RESULT NOTES
Verified Results  (1) CBC/ PLT (NO DIFF) 29Apr2017 08:54AM Michelle Carty     Test Name Result Flag Reference   HEMATOCRIT 37 8 %  34 8-46 1   HEMOGLOBIN 12 1 g/dL  11 5-15 4   MCHC 32 0 g/dL  31 4-37 4   MCH 30 3 pg  26 8-34 3   MCV 95 fL  82-98   PLATELET COUNT 367 Thousands/uL  149-390   RBC COUNT 3 99 Million/uL  3 81-5 12   RDW 13 8 %  11 6-15 1   WBC COUNT 4 90 Thousand/uL  4 31-10 16   MPV 10 8 fL  8 9-12 7

## 2018-01-11 NOTE — RESULT NOTES
Verified Results  (B) C/V/U EXPANDED W/O PAP W/O HPV PLUS (NON-NY) [id number] 12:00AM Chaparro Summers     Test Name Result Flag Reference   Chlamyd  Trach by Malaika Not Detected     SPECIMEN SOURCE:      C/V/U EXPANDED W/O PAP W/O HPV PLUS (NON-NY), NOT                         PROVIDED  CLINICAL INFORMATION: Provided Diagnosis Codes: N89 8  MOLECULAR               INTERPRETATION:       Results do not favor Bacterial Vaginosis (BV)  MOLECULAR COMMENT:    Lactobacillus DNA is detected  No anaerobic                          bacterial DNA (from G  vaginalis, A  vaginae,                         Megasphaera, BVAB2) is detected  GC BY MULTIPLEX PCR Not Detected     Ureaplasma By Multiplex PCR Not Detected     Mycoplasma Genitalium Multiplex Not Detected     Trichomonas By Mult PCR Not Detected     Herpes Simplex I Multi  Not Detected     Herpes II by Multiplex Not Detected     CANDIDA GENUS Not Detected     C ALBICANS BY PCR Not Detected     G  VAGINALIS BY PCR Not Detected     A  VAGINALIS BY PCR Not Detected     LACTOBACILLUS (SPECIES) BY PCR Detected     MEGASPHAERA TYPE 1 PCR Not Detected     BVAB2 BY PCR Not Detected     LACTOBACILLUS SP  LOG (CELLS/mL) 3 25 - 5 25     G  VAGINALS BY RT-PCR <3 25     A VAGINALIS LOG (CELL/ML) <3 25     CHLAMYDIA TRACHOMATIS BY MULTIPLEX PCR (1,6,7)  GC BY MULTIPLEX PCR (1,6,7)  UREAPLASMA BY MULTIPLEX PCR (1,2,6,7)  MYCOPLASMA GENITALIUM BY MULTIPLEX PCR (1,2,6,7)  TRICHOMONAS BY MULTIPLEX PCR (1,6,7)  HERPES SIMPLEX I BY MULTIPLEX (NON [place]) (1,6,7)  HERPES SIMPLEX II BY MULTIPLEX (NON [place]) (1,6,7)  CANDIDA GENUS (3,4,6,7)  C ALBICANS BY PCR (3,4,6,7)  G  VAGINALIS BY RT-PCR (5,6,8)  A  VAGINALIS BY RT-PCR (5,6,8)  LACTOBACILLUS (SPECIES) BY PCR (5,6,8)  MEGASPHAERA TYPE 1 BY RT PCR (5,6,8)  BVAB2 BY RT-PCR (5,6,8)  LACTOBACILLUS SP  LOG (CELLS/mL) (5,6,8)  G  VAGINALIS LOG (CELLS/mL) (5,6,8)  A   VAGINALIS LOG (CELLS/ML) (5,6,8)  (1)  This test is an in vitro test for the detection of sexually transmitted   infections (STIs) in clinical specimens  The test utilizes   amplification of target DNA by the Polymerase Chain Reaction (PCR)   based on dual priming oligonucleotide technology and detects STI DNA  (2)  Mycoplasma genitalium is a causative agent of Nongonoccocal urethritis   (CARMINA) in men and cervicitis in women  Ureaplasma spp  is associated   with CARMINA in men  (3)  The common causes of vulvovaginal candidiasis include Candida albicans,   Candida tropicalis, Candida glabrata, Candida parapsilosis, Candida   dubliniensis, Katja krusei and a few other species that colonize the   vagina  In several studies, non-albicans species such as tropicalis,   krusei and glabrata have demonstrated resistance to fluconazole and   other related azole antifungals  Using comparable data from another   assay (BD Trendr VPIII microbial identification test), molecular   testing using liquid-based cytology specimens in general for Candida   may not be as sensitive as culture or visual identification  (4)  This test utilizes amplification of target DNA for Candida genus and   selected Candida species by Polymerase Chain Reaction (PCR)  The limit   of detection in the genus panel is 100 copies for Candida genus and for   C  albicans  The limit of detection in the species panel is 10 copies   for C  albicans, C  dubliniensis, C  glabrata, C  krusei, C    parapsilosis, and C  tropicalis  Saccharomyces cerevisiae shares   significant homology with Candida genus and may cross react and give a   presumptive positive result; however, S  cerevisiae is rarely seen in   the cervicovaginal microbiome, but may be seen in the GI tract  Saccharomyces cerevisiae may have an interfering reaction with C    albicans in the species panel, resulting in lower amplification of the   C  albicans    (5)  This assay utilizes quantitative real time PCR (multiple detection   temperatures technology, MuDT(TM)) in liquid cytology specimens to   quantify the levels of nucleic acid of Lactobacillus species, G    vaginalis and A  vaginae, and also detects nucleic acid from several   obligate anaerobes associated with Bacterial Vaginosis (BV)   (6)  This test was evaluated and its performance characteristics determined   by [employer]  It has not been cleared or approved by   the [place]  Food and Drug Administration  The FDA has determined that   such clearance or approval is not necessary  [employer]   is certified under the Clinical Laboratory Improvement Act of 1988   (CLIA) as qualified to perform high complexity clinical testing   (7)  Results should be interpreted together with past and current clinical   and laboratory data  (8)  Results should be interpreted in light of current and past laboratory   data and symptomatology

## 2018-01-12 VITALS
SYSTOLIC BLOOD PRESSURE: 104 MMHG | HEIGHT: 67 IN | BODY MASS INDEX: 31.27 KG/M2 | WEIGHT: 199.25 LBS | DIASTOLIC BLOOD PRESSURE: 60 MMHG

## 2018-01-12 VITALS
DIASTOLIC BLOOD PRESSURE: 74 MMHG | HEIGHT: 67 IN | SYSTOLIC BLOOD PRESSURE: 111 MMHG | BODY MASS INDEX: 30.93 KG/M2 | WEIGHT: 197.05 LBS

## 2018-01-13 VITALS
WEIGHT: 198 LBS | BODY MASS INDEX: 31.08 KG/M2 | SYSTOLIC BLOOD PRESSURE: 120 MMHG | HEIGHT: 67 IN | DIASTOLIC BLOOD PRESSURE: 70 MMHG

## 2018-01-13 VITALS
BODY MASS INDEX: 31.08 KG/M2 | WEIGHT: 198 LBS | HEIGHT: 67 IN | SYSTOLIC BLOOD PRESSURE: 114 MMHG | DIASTOLIC BLOOD PRESSURE: 66 MMHG

## 2018-01-13 VITALS
HEIGHT: 67 IN | DIASTOLIC BLOOD PRESSURE: 69 MMHG | SYSTOLIC BLOOD PRESSURE: 85 MMHG | BODY MASS INDEX: 31.36 KG/M2 | WEIGHT: 199.8 LBS

## 2018-01-13 VITALS
HEIGHT: 67 IN | DIASTOLIC BLOOD PRESSURE: 59 MMHG | SYSTOLIC BLOOD PRESSURE: 100 MMHG | WEIGHT: 203 LBS | BODY MASS INDEX: 31.86 KG/M2

## 2018-01-13 VITALS
HEIGHT: 67 IN | SYSTOLIC BLOOD PRESSURE: 104 MMHG | DIASTOLIC BLOOD PRESSURE: 69 MMHG | BODY MASS INDEX: 30.45 KG/M2 | WEIGHT: 194 LBS

## 2018-01-14 VITALS
DIASTOLIC BLOOD PRESSURE: 62 MMHG | WEIGHT: 195.6 LBS | BODY MASS INDEX: 30.7 KG/M2 | HEIGHT: 67 IN | HEART RATE: 102 BPM | SYSTOLIC BLOOD PRESSURE: 110 MMHG

## 2018-01-14 VITALS
SYSTOLIC BLOOD PRESSURE: 121 MMHG | WEIGHT: 198 LBS | HEIGHT: 67 IN | BODY MASS INDEX: 31.08 KG/M2 | DIASTOLIC BLOOD PRESSURE: 69 MMHG

## 2018-01-14 VITALS
HEIGHT: 67 IN | BODY MASS INDEX: 30.86 KG/M2 | SYSTOLIC BLOOD PRESSURE: 102 MMHG | WEIGHT: 196.6 LBS | DIASTOLIC BLOOD PRESSURE: 71 MMHG

## 2018-01-14 NOTE — RESULT NOTES
Verified Results  (B) PAP + HPV PLUS + CT + GC [id number] 02:34PM Candy Maher     Test Name Result Flag Reference   PAP, LIQUID-BASED NILM     DIAGNOSIS:            Negative for intraepithelial lesion or malignancy  ADEQUACY:             Satisfactory for evaluation /                         Endocervical/transformation zone component                         present  COMMENT:              This Pap smear was screened with the assistance                         of the ComQi ThinPrep(TM) Imaging System and                         screened by a cytotechnologist   SPECIMEN SOURCE:      PAP + HPV PLUS + CT + GC, CERVIX  CLINICAL INFORMATION: LMP: 5/8/16                        Provided Diagnosis Codes: Z01 419,Z20 2                        , V72 31, V01 6                                                Cervicovaginal cytology should be considered a                         screening procedure subject to false negatives                         and false positives  Results are more reliable                         when a satisfactory sample is obtained on a                         regular repetitive basis, and should be                         interpreted together with past and current                         clinical data  ELECTRONICALLY SIGNED   BY:                   Screened By: JAQUELINE Pedraza (ASCP)   Case                         Electronically Signed 05/18/2016   HPV HR (NON 16/18) Not Detected     HPV 18+ Not Detected     HPV16+ Not Detected     CHLAMYDIA, LIQUID-BASED Not Detected     GONORRHEA, LIQUID-BASED Not Detected     HPV HR(NON 16/18) (2,4,5,6)  HPV 18+ (2,4,5,6)  HPV16+ (2,4,5,6)  CHLAMYDIA, LIQUID-BASED (3,6)  GONORRHEA, LIQUID-BASED (1,3,6)  (1)  Rare cross-reactivity may occur in this amplified DNA GC assay due to   certain strains of N  cinerea, N  subflava and N  lactamica    (2)  The jerardo(R) HPV test is FDA-cleared for ThinPrep(R) specimens and   detects genomic HPV DNA in the polymorphic L1 region in 14 subtypes:   Type 16, Type 18, and other high risk types   (66,35,94,08,91,38,61,44,80,84,92,34)  The test has been modified and   validated for use in SurePath(TM) specimens  (3)  Chlamydia trachomatis (CT) and Neisseria gonorrhoeae (NG)  qualitative   detection is performed on urogenital specimens using an FDA approved   platform  -  Dexmo(TM) Qx Amplified DNA Assay tested with the BD Viper(TM)   System using Strand Displacement Amplification technology  -  The [employer] Integrated Optics Combo 2(R) Assay detects ribosomal RNA (rRNA) using   target capture and Transcription-Mediated Amplification (TMA)   technology  -  The jerardo(R) CT/NG v2 0 detects DNA using Polymerase Chain Reaction   (PCR) technology  (4)  HPV types 16 and/or 18 that were Not Detected were undetectable or   below the pre-set threshold  (5)  The non-repeat rate for HPV genotyping assays varies from 5 to 15%  In   the NILM cytology category, there is a low positive predictive value   (PPV = 15-20%) for CIN2+ with a positive high risk HPV result  (6)  Results should be interpreted together with past and current clinical   and laboratory data

## 2018-01-14 NOTE — PROGRESS NOTES
Active Problems    1  10 weeks gestation of pregnancy (V22 2) (Z3A 10)   2  19 weeks gestation of pregnancy (V22 2) (Z3A 19)   3  1st trimester screening (V28 89) (Z36)   4  Contact with or exposure to sexually transmitted disease (V01 6) (Z20 2)   5  Elevated serum glucose (790 29) (R73 9)   6  Encounter for fetal anatomic survey (V28 81) (Z36)   7  Encounter for gynecological examination with Papanicolaou smear of cervix   (V72 31,V76 2) (Z01 419,Z12 4)   8  Encounter for pregnancy related examination in second trimester (V22 1) (Z34 82)   9  Encounter for prenatal care in second trimester of first pregnancy (V22 0) (Z34 02)   10  Encounter for screening for risk of pre-term labor (V28 82) (Z36)   11  Encounter for vaccination (V05 9) (Z23)   12  Gestational diabetes (648 80) (O24 419)   13  Hyperglycemia in pregnancy (648 80) (O99 810)   14  Leukorrhea (623 5) (N89 8)   15  Pelvic pain in female (625 9) (R10 2)   16  Third pregnancy (V22 2) (Z33 1)   17  Urinary frequency (788 41) (R35 0)   18  Visit for routine gyn exam (V72 31) (Z01 419)    Past Medical History    1  No pertinent past medical history    Surgical History    1  Denied: History Of Prior Surgery    Family History  Mother    1  Family history of arthritis (V17 7) (Z82 61)  Father    2  Family history of Hypopharyngeal cancer    Social History    · Currently sexually active   · Does not exercise (V69 0) (Z72 3)   · Denied: History of Drug use   · Housewife or homemaker   ·    · Never a smoker   · No caffeine use   · Non drinker / no alcohol use    Current Meds   1  OneTouch Delica Lancets 52T Miscellaneous; test 4 times daily; Therapy: 63EZS0250 to (Evaluate:16Jun2017)  Requested for: 76EWI9031; Last   Rx:60Ynf8259 Ordered   2  OneTouch Verio In [employer]; test 4 times daily; Therapy: 51RVS2048 to (Evaluate:16Jun2017)  Requested for: 21EGW4229; Last   Rx:33Upg1541 Ordered   3   Prenate Essential 29-0 6-0 4-340 MG Oral Capsule; take 1 capsule daily; Therapy: 73UQC0302 to (Evaluate:21Cki7274)  Requested for: 32OSD4906; Last   Rx:67Hkn7376 Ordered    Allergies    1  No Known Drug Allergies    2  No Known Environmental Allergies   3  No Known Food Allergies    Provider Comments  Provider Comments:   SALVATORE: 4/10/2017  EGA: 33 3/7 weeks    Dear Dr Celia Alonso: Thank you for referring your patient to the Diabetes and Pregnancy Program at [address] [place] [address]  The patient attended Class 2 received the following education:    Â· Weight gain during in pregnancy  Based on the patient's height of 67 inches, pre-pregnancy weight of 176 pounds (BMI-27  4) we would recommend a total weight gain of 15-25 pounds for the pregnancy  o The patient's current weight is 196 pounds, and her weight gain to date is 20 pounds  Based on this, we are recommending the patient 5 pounds more for the remainder of the pregnancy  Â· Medical Nutrition Therapy for diabetes and pregnancy  The patient's three day food diary was reviewed and discussed  The patient was instructed on the following:  o Individualized meal plan; discussed how to incorporate ethnic foods  o Use of food diary to maintain a meal plan  Food diary indicates patient is undereating to control BG  Understands how to include more foods into her meal plan   o Importance of protein as it relates to blood glucose control  Â· Review of blood glucose log  Reinforcement of blood glucose goals and reporting guidelines  Â· Ultrasounds every four weeks in the [address] [place] [address] to evaluate fetal growth  Â· Exercise Guidelines:   o Walking up to thirty minutes daily can reduce blood glucose levels  o Monitor for greater than four contractions per hour    o The patient has been instructed not to begin physical activity if she has been instructed not to exercise by your office  Â· Sick day guidelines    Â· Post-partum guidelines:  o Completion of a 75 gram glucose tolerance test at 6 weeks post-partum to check for type 2 diabetes  o 20% weight loss and 30 minutes of exercise 5 times per week reduces the risk of type 2 diabetes  Maris [place] Breastfeeding guidelines  Â· Report blood glucose levels to Maternal-Fetal Medicine office weekly or as directed  o Phone: [phone]  o Fax: [phone]  o Email: [email or web address]     Please contact the Diabetes and Pregnancy Program at [phone] if you have questions  Time spent with patient 5:30-6:30 PM; time spent face to face counseling greater than 50% of the appointment  Sincerely,     Alexi Castanon, MS, RD, CDE, LDN  Diabetes Educator  Diabetes and Pregnancy Program        Future Appointments    Date/Time Provider Specialty Site   2017 04:00 PM JOHNNY Franks   Obstetrics/Gynecology [facility] AT [facility]   2017 02:00 PM  [place], [address]   Electronically signed by : Karyn Mack RD; 2017  6:58PM EST                       (Author)    Electronically signed by : Kiersten Calvin; 2017 10:06AM EST                       (Author)

## 2018-01-14 NOTE — MISCELLANEOUS
Message  Return to work or school:   Julio Urena is under my professional care  She was seen in my office on 03/31/2017       Patient will Be at the hospital on 04/04/2017 to deliver her baby  Any Questions please call  Dr Lugenia Gilford /-tf        Signatures   Electronically signed by : Lugenia Gilford, M D ; Mar 31 2017  2:20PM EST                       (Author)

## 2018-01-15 NOTE — PROGRESS NOTES
MAR 17 2017         RE: Deysi Andrade                                        To: JOHNNY Hernandez    MR#: [id number]                                  [address]   : [address]: [id number]:ISABELA Marcano Philipmichelle U  6    (Exam #: [id number])                           Fax: [phone]      The LMP of this 31year old,  G1, P0-0-0-0 patient was 2016, giving   her an SALVATORE of APR 10 2017 and a current gestational age of 42 weeks 4 days   by dates  A sonographic examination was performed on [facility] 2017 using   real time equipment  The ultrasound examination was performed using   abdominal technique  The patient has a BMI of 31 0  Her blood pressure   today was 114/66  Earliest ultrasound found in her record: 16   9w2d  4/15/17 SALVATORE      Cardiac motion was observed at 147 bpm       INDICATIONS      diabetes, gestational, class A2      Exam Types      Amniotic Fluid Index      RESULTS      Fetus # 1 of 1   Vertex presentation   Placenta Location = Posterior   No placenta previa   Placenta Grade = II      The NST was reactive with no decelerations  AMNIOTIC FLUID      Q1: 4 3      Q2: 4 5      Q3: 3 6      Q4: 4 1   PERLITA Total = 16 6 cm   Amniotic Fluid: Normal      IMPRESSION      Renteria IUP   Vertex presentation   Regular fetal heart rate of 147 bpm   Posterior placenta   No placenta previa      GENERAL COMMENT      Her follow up care should include twice weekly NST's and a once weekly   amniotic fluid assessment, along with her daily kick counts  RANDI Marina M D     Maternal-Fetal Medicine   Electronically signed 17 20:19

## 2018-01-15 NOTE — RESULT NOTES
Verified Results  (1) SEQUENTIAL SCREEN 2 15ZUS2051 10:08AM Ifeoma Cartagena     Test Name Result Flag Reference   SCAN RESULT      Results available in the [facility], [facility]

## 2018-01-15 NOTE — RESULT NOTES
Verified Results  (1) URINE CULTURE [id number] 11:06AM Rachael LORENZO Order Number: [id number]     Test Name Result Flag Reference   CLINICAL REPORT (Report)     Test:        Urine culture  Specimen Type:   Urine  Specimen Date:   11/19/2016 11:06 AM  Result Date:    11/20/2016 1:45 PM  Result Status:   Final result  Resulting Lab:   [place] [address]            Tel: [phone]      CULTURE                                       ------------------                                   No Growth <1000 cfu/mL

## 2018-01-15 NOTE — PROGRESS NOTES
MAR 23 2017         RE: Jonel Dose                                        To: JOHNNY Lilly    MR#: [id number]                                  [address]   : [address]: [id number]:MUZQS                             Jag Marcano U  6    (Exam #: [id number])                           Fax: [phone]      The LMP of this 31year old,  G1, P0-0-0-0 patient was 2016, giving   her an SALVATORE of APR 10 2017 and a current gestational age of 42 weeks 3 days   by dates  A sonographic examination was performed on MAR 23 2017 using   real time equipment  The ultrasound examination was performed using   abdominal technique  The patient has a BMI of 30 4  Her blood pressure   today was 104/69  Earliest ultrasound found in her record: 16   9w2d  4/15/17 SALVATORE      Cardiac motion was observed at 153 bpm       INDICATIONS      diabetes, gestational, class A2      Exam Types      Amniotic Fluid Index   NST      RESULTS      Fetus # 1 of 1   Vertex presentation   Placenta Location = Posterior   No placenta previa   Placenta Grade = II      The NST was reactive with no decelerations  AMNIOTIC FLUID      Q1: 2 2      Q2: 4 3      Q3: 4 6      Q4: 4 6   PERLITA Total = 15 7 cm   Amniotic Fluid: Normal      IMPRESSION      Renteria IUP   Vertex presentation   Regular fetal heart rate of 153 bpm   Posterior placenta   No placenta previa      RECOMMENDATION      PERLITA: 1 Week   NST: 2X per week      RANDI Tamayo S , R ABRAHAN C S  JOHNNY Gar     Maternal-Fetal Medicine   Electronically signed 17 16:16

## 2018-01-16 NOTE — RESULT NOTES
Verified Results  (1) GLUCOSE, 1HR PG (50gm Glu Challenge Preg-Pts) [id number] 08:15AM Sue Barry    Order Number: [id number]     Test Name Result Flag Reference   GLUCOSE, 1 Hr  mg/dL H See Comment   <=134 Normal  135-179 Impaired glucose fasting   Perform 3 Hour Glucose Tolerance  >=180 Diagnosis Gestational Diabetes Mellitus

## 2018-01-16 NOTE — PROGRESS NOTES
MAR 10 2017         RE: Manuel Farnsworth                                        To: JOHNNY Thompson    MR#: [id number]                                  [address]   : [address]: [id number]:HRNVY                             Jag Coronel U  6    (Exam #: [id number])                           Fax: [phone]      The LMP of this 31year old,  G1, P0-0-0-0 patient was 2016, giving   her an SALVATORE of APR 10 2017 and a current gestational age of 27 weeks 4 days   by dates  A sonographic examination was performed on MAR 10 2017 using   real time equipment  The ultrasound examination was performed using   abdominal technique  The patient has a BMI of 31 0  Her blood pressure   today was 121/69  Earliest ultrasound found in her record: 16   9w2d  4/15/17 SALVATORE      Cardiac motion was observed at 138 bpm       INDICATIONS      diabetes, gestational, class A2      Exam Types      Amniotic Fluid Index      RESULTS      Fetus # 1 of 1   Vertex presentation   Placenta Location = Posterior   Placenta Grade = II      The NST was reactive with no decelerations  AMNIOTIC FLUID      Q1: 3 9      Q2: 2 8      Q3: 4 5      Q4: 3 5   PERLITA Total = 14 6 cm   Amniotic Fluid: Normal      BIOPHYSICAL PROFILE      The Biophysical Profile score was 10/10  Breathin  Movement: 2  Tone: 2  AFV: 2  NST: 2   The NST was reactive with no decelerations  IMPRESSION      Renteria IUP   Vertex presentation   Regular fetal heart rate of 138 bpm   Posterior placenta      GENERAL COMMENT      Her follow up care should include twice weekly NST's and a once weekly   amniotic fluid assessment, along with her daily kick counts  RANDI Montague M D     Maternal-Fetal Medicine   Electronically signed 03/10/17 22:21

## 2018-01-16 NOTE — PROGRESS NOTES
Active Problems    1  10 weeks gestation of pregnancy (V22 2) (Z3A 10)   2  19 weeks gestation of pregnancy (V22 2) (Z3A 19)   3  1st trimester screening (V28 89) (Z36)   4  Contact with or exposure to sexually transmitted disease (V01 6) (Z20 2)   5  Elevated serum glucose (790 29) (R73 9)   6  Encounter for fetal anatomic survey (V28 81) (Z36)   7  Encounter for gynecological examination with Papanicolaou smear of cervix   (V72 31,V76 2) (Z01 419,Z12 4)   8  Encounter for pregnancy related examination in second trimester (V22 1) (Z34 82)   9  Encounter for prenatal care in second trimester of first pregnancy (V22 0) (Z34 02)   10  Encounter for screening for risk of pre-term labor (V28 82) (Z36)   11  Encounter for vaccination (V05 9) (Z23)   12  Gestational diabetes (648 80) (O24 419)   13  Hyperglycemia in pregnancy (648 80) (O99 810)   14  Insulin controlled gestational diabetes mellitus (GDM) in third trimester (648 83)    (O24 414)   15  Leukorrhea (623 5) (N89 8)   16  Pelvic pain in female (625 9) (R10 2)   17  Third pregnancy (V22 2) (Z33 1)   18  Urinary frequency (788 41) (R35 0)   19  Visit for routine gyn exam (V72 31) (Z01 419)    Past Medical History    1  History of Encounter for vaccination (V05 9) (Z23)   2  No pertinent past medical history    Surgical History    1  Denied: History Of Prior Surgery    Family History  Mother    1  Family history of arthritis (V17 7) (Z82 61)  Father    2  Family history of Hypopharyngeal cancer    Social History    · Currently sexually active   · Does not exercise (V69 0) (Z72 3)   · Denied: History of Drug use   · Housewife or homemaker   ·    · Never a smoker   · No caffeine use   · Non drinker / no alcohol use    Current Meds   1  OneTouch Delica Lancets 90U Miscellaneous; test 4 times daily; Therapy: 30XOH0963 to (Evaluate:16Jun2017)  Requested for: 13LEX5148; Last   Rx:29Mvy3787 Ordered   2   OneTouch Verio In [employer]; test 4 times daily; Therapy: 96TPH5821 to (Evaluate:2017)  Requested for: 29YQX5834; Last   Rx:04Woq1018 Ordered   3  Prenate Essential 29-0 6-0 4-340 MG Oral Capsule; take 1 capsule daily; Therapy: 54RYJ3875 to (Evaluate:2017)  Requested for: 06LRY9784; Last   Rx:74Pqd2265 Ordered    Allergies    1  No Known Drug Allergies    2  No Known Environmental Allergies   3  No Known Food Allergies    Plan    1  Levemir FlexTouch 100 UNIT/ML Subcutaneous Solution Pen-injector; Inject 28   units levemir at bedtime, titrate asdirected   2  NovoFine 30G X 8 MM Miscellaneous; USE AS DIRECTED  1 daily   3  (1) COMPREHENSIVE METABOLIC PANEL; Status:Active; Requested for:95Rhc5716;    4  (1) COMPREHENSIVE METABOLIC PANEL; Status:Active; Requested for:50Zpa7547;    5  (1) HEMOGLOBIN A1C; Status:Active; Requested for:43Yud6986;    6  (Q) HEMOGLOBIN A1c WITH eAG; Status:Active; Requested for:70Yzc6318;     Provider Comments  Provider Comments:   SALVATORE: 4/10/2017  EGA: 29 / weeks     Dear Dr David Conception: Your patient was seen in the office today for insulin teaching  Please refer to Diabetes and Pregnancy Progress Record for complete documentation of patient's blood glucose levels  Height: 67 inches  Weight: 199 75 pounds    The patient was instructed on the following:    Â· Initiate 28 units at HS  Â· Insulin administration times, insulin action  Â· Hypoglycemia signs, symptoms and treatment  Â· Increase in maternal-fetal surveillance with insulin initiation  Â· Side effects of hyperglycemia in pregnancy including macrosomia,  hypoglycemia, polyhydramnios, pre-term labor and stillbirth  Â· Continue to monitor blood glucoses via fingerstick fasting (goal 60 mg/dl to 90 mg/dl) and two hours post prandial (goal less than 120 mg/dl)  Â· Follow up with our office on Friday, 3/3/17 for evaluation of blood glucose levels  Â· Non-stress testing two times weekly and PERLITA testing beginning at 32 weeks gestation     Â· Ultrasounds every 4 weeks at the [facility]  Â· HbA1c every 6 to 8 weeks, CMP ordered  Thank you for the opportunity to participate in the care of this patient  I can be reached at [phone] should you have any questions  Time spent with patient 3:25-3:55 PM; time spent face to face counseling greater than 50% of the appointment      Sincerely,    Amy Walters, MS, RD, CDE, LDN  Diabetes Educator  Diabetes and Pregnancy Program          Future Appointments    Date/Time Provider Specialty Site   2017 04:00 PM Ranjan Mckee, 10 [facility] Obstetrics/Gynecology [facility] OB/GYN AT [facility]   2017 08:00 AM  Jeet, [address]   2017 08:00 AM  Jeet, [facility] [address]   03/10/2017 03:00 PM  Jeet, [facility] [address]   03/10/2017 03:30 PM  Jeet, [facility] [address]   2017 06:30 PM  Jeet, [address]   Electronically signed by : Satish Haddad RD; 2017  4:24PM EST                       (Author)    Electronically signed by : Ras[facility]eda Marrufo, [facility] ; Mar  1 2017  4:23PM EST                       (Author)

## 2018-01-16 NOTE — PROGRESS NOTES
2017         RE: Juanita Farris                                        To: RomeoirajJOHNNY Le    MR#: [id number]                                  [address]   : [address]: [id number]                             JeetSrini lopezjorge l U  6    (Exam #: [id number])                           Fax: [phone]      The LMP of this 31year old,  G1, P0-0-0-0 patient was 2016, giving   her an SALVATORE of APR 10 2017 and a current gestational age of 29 weeks 1 day   by dates  A sonographic examination was performed on 2017 using   real time equipment  The ultrasound examination was performed using   abdominal technique  The patient has a BMI of 31 2  Her blood pressure   today was 85/69  Earliest ultrasound found in her record: 16   9w2d  4/15/17 SALVATORE      Sarita Esquivel has no complaints today  She reports regular fetal movement and denies   problems related to vaginal bleeding,  labor, or hypertension  She   was diagnosed with gestational diabetes based upon 3 out of 4 abnormal   values on a three-hour glucose tolerance test on   Despite   dietary modifications, she continues to display evidence of persistent   fasting hyperglycemia  Cardiac motion was observed at 142 bpm       INDICATIONS      diabetes, gestational      Exam Types      Level I   NST      RESULTS      Fetus # 1 of 1   Vertex presentation   Fetal growth appeared normal   Placenta Location = Posterior, left lateral   No placenta previa   Placenta Grade = I      The NST was reactive with no decelerations        MEASUREMENTS (* Included In Average GA)      AC              29 5 cm        33 weeks 4 days* (42%)   BPD              9 0 cm        36 weeks 4 days* (89%)   HC              32 1 cm        35 weeks 6 days* (64%)   Femur            6 6 cm        33 weeks 6 days* (51%)      Cerebellum       4 5 cm        35 weeks 2 days      HC/AC           1 09 FL/AC           0 22   FL/BPD          0 73   EFW (Ac/Fl/Hc)  2347 grams - 5 lbs 3 oz                 (45%)      THE AVERAGE GESTATIONAL AGE is 34 weeks 6 days +/- 21 days  AMNIOTIC FLUID      Q1: 2 6      Q2: 4 1      Q3: 2 7      Q4: 2 1   PERLITA Total = 11 4 cm   Amniotic Fluid: Normal      ANATOMY DETAILS      Visualized Appearing Sonographically Normal:   HEAD: (Calvarium, BPD Level, Lateral Ventricles, Cerebellum);      FACE/NECK: (Nose/Lips);    TH  CAV : (Diaphragm); HEART: (Four Chamber   View, Proximal Left Outflow, Cardiac Axis, Cardiac Position);    STOMACH,   RIGHT KIDNEY, LEFT KIDNEY, BLADDER, PLACENTA      Not Visualized:   HEAD: (Cavum); HEART: (Proximal Right Outflow)      BIOPHYSICAL PROFILE      The Biophysical Profile score was 10/10  Breathin  Movement: 2  Tone: 2  AFV: 2  NST: 2   The NST was reactive with no decelerations  IMPRESSION      Renteria IUP   34 weeks and 6 days by this ultrasound  (SALVATORE=2017)   Vertex presentation   Fetal growth appeared normal   Regular fetal heart rate of 142 bpm   Posterior, left lateral placenta   No placenta previa      GENERAL COMMENT      No fetal structural abnormality is identified on the Level I survey today  Fetal interval growth and amniotic fluid volume are normal       Today's ultrasound findings and suggested follow-up were discussed in   detail with Loretta MONTANEZ recommended initiation of insulin therapy based upon   persistent fasting hyperglycemia despite dietary changes  She also met   with a member of the Diabetes and Pregnancy team for instructions   regarding insulin administration  She has been initiated on a regimen of   28 units of Levemir at night  Daily third trimester fetal kick counting   was discussed at the visit today  Continuation of twice per week fetal   nonstress testing and weekly amniotic fluid volume assessments is   recommended  Fetal growth will be reassessed in 4 weeks   Jaclyn Peraza should   continue to maintain contact with the Diabetes and Pregnancy program in   the [facility]  at least once a week for review of her blood glucose   values and adjustment of insulin doses  Delivery is recommended at 44 to   40 weeks gestation, earlier if otherwise clinically indicated  The history of gestational diabetes during this pregnancy is associated   with an increased risk for the development of overt, Type II diabetes   later in [place]ian's life  Her risk for developing Type II diabetes is as high   as 50%  A 75 gram, two-hour, OGTT is suggested as initial follow up 6-12   weeks following delivery  The face to face time, in addition to time spent discussing ultrasound   results, was approximately 10 minutes, greater than 50% of which was spent   during counseling and coordination of care  RANDI Ma M D  Maternal-Fetal Medicine   Electronically signed 02/28/17 17:24           Electronically signed by:Lele GAUTHIER    Mar  1 2017 10:48AM EST

## 2018-01-16 NOTE — RESULT NOTES
Verified Results  (1) CBC/PLT/DIFF [id number] 08:15AM Hermann Oliveira Order Number: [id number]     Test Name Result Flag Reference   WBC COUNT 7 28 Thousand/uL  4 31-10 16   RBC COUNT 3 68 Million/uL L 3 81-5 12   HEMOGLOBIN 11 3 g/dL L 11 5-15 4   HEMATOCRIT 34 4 % L 34 8-46  1   MCV 94 fL  82-98   MCH 30 7 pg  26 8-34 3   MCHC 32 8 g/dL  31 4-37 4   RDW 14 1 %  11 6-15 1   MPV 10 9 fL  8 9-12 7   PLATELET COUNT 503 Thousands/uL  149-390   nRBC AUTOMATED 0 /100 WBCs     NEUTROPHILS RELATIVE PERCENT 77 % H 43-75   LYMPHOCYTES RELATIVE PERCENT 18 %  14-44   MONOCYTES RELATIVE PERCENT 4 %  4-12   EOSINOPHILS RELATIVE PERCENT 1 %  0-6   BASOPHILS RELATIVE PERCENT 0 %  0-1   NEUTROPHILS ABSOLUTE COUNT 5 58 Thousands/?L  1 85-7 62   LYMPHOCYTES ABSOLUTE COUNT 1 33 Thousands/?L  0 60-4 47   MONOCYTES ABSOLUTE COUNT 0 26 Thousand/?L  0 17-1 22   EOSINOPHILS ABSOLUTE COUNT 0 06 Thousand/?L  0 00-0 61   BASOPHILS ABSOLUTE COUNT 0 01 Thousands/?L  0 00-0 10   - Patient Instructions: This bloodwork is non-fasting  Please drink two glasses of water morning of bloodwork  - Patient Instructions: This bloodwork is non-fasting  Please drink two glasses of water morning of bloodwork  Plan  Hyperglycemia in pregnancy    · (1) GLUCOSE TOLERANCE TEST, 3HR; Status:Active;  Requested JASS:78WQZ6909;

## 2018-01-17 NOTE — PROGRESS NOTES
Active Problems    1  10 weeks gestation of pregnancy (V22 2) (Z3A 10)   2  19 weeks gestation of pregnancy (V22 2) (Z3A 19)   3  1St trimester screening (V28 89) (Z36)   4  Contact with or exposure to sexually transmitted disease (V01 6) (Z20 2)   5  Elevated serum glucose (790 29) (R73 9)   6  Encounter for fetal anatomic survey (V28 81) (Z36)   7  Encounter for gynecological examination with Papanicolaou smear of cervix   (V72 31,V76 2) (Z01 419,Z12 4)   8  Encounter for pregnancy related examination in second trimester (V22 1) (Z34 82)   9  Encounter for prenatal care in second trimester of first pregnancy (V22 0) (Z34 02)   10  Encounter for screening for risk of pre-term labor (V28 82) (Z36)   11  Encounter for vaccination (V05 9) (Z23)   12  Gestational diabetes (648 80) (O24 419)   13  Hyperglycemia in pregnancy (648 80) (O99 810)   14  Leukorrhea (623 5) (N89 8)   15  Pelvic pain in female (625 9) (R10 2)   16  Third pregnancy (V22 2) (Z33 1)   17  Urinary frequency (788 41) (R35 0)   18  Visit for routine gyn exam (V72 31) (Z01 419)    Past Medical History    1  No pertinent past medical history    Surgical History    1  Denied: History Of Prior Surgery    Family History  Mother    1  Family history of arthritis (V17 7) (Z82 61)  Father    2  Family history of Hypopharyngeal cancer    Social History    · Currently sexually active   · Does not exercise (V69 0) (Z72 3)   · Denied: History of Drug use   · Housewife or homemaker   ·    · Never a smoker   · No caffeine use   · Non drinker / no alcohol use    Current Meds   1  OneTouch Delica Lancets 99L Miscellaneous; test 4 times daily; Therapy: 96YFV7197 to (Evaluate:16Jun2017)  Requested for: 42UTF4871; Last   Rx:33Cqi3738 Ordered   2  OneTouch Verio In [employer]; test 4 times daily; Therapy: 35NOG8717 to (Evaluate:16Jun2017)  Requested for: 69LUF9821; Last   Rx:22Lpt5313 Ordered   3   Prenate Essential 29-0 6-0 4-340 MG Oral Capsule; take 1 capsule daily; Therapy: 79GMQ8115 to (Evaluate:11Aqc5810)  Requested for: 00UDL1726; Last   Rx:07Lss9678 Ordered    Allergies    1  No Known Drug Allergies    2  No Known Environmental Allergies   3  No Known Food Allergies    Provider Comments  Provider Comments:   SALVATORE: 4/10/17  EGA: 32 3/7 weeks             Dear Dr Cass Benavidez: Thank you for referring your patient to the Diabetes and Pregnancy Program at [facility]  The patient attended Class 1 received the following education:    Â· Pathophysiology of diabetes and pregnancy  This includes maternal-fetal complications such as fetal macrosomia,  hypoglycemia, polyhydramnios, increased incidence of  section, pre-term labor and in severe cases, fetal demise and stillbirth  Â· Self-monitoring of blood glucose levels: fasting (goal 60mg/dl to 90mg/dl) and two hours after the start of the meal less (goal less than 120mg/dl)  The patient was provided with a Verio blood glucose meter and supplies  Â· Medical Nutrition Therapy for diabetes and pregnancy  The patient was provided with a 2200 calorie gestational diabetes meal plan and the following was reviewed:   o Basic review of macronutrients   o Meal pattern should consist of three small meals and three snacks daily  o Carbohydrate gram amounts per meal   o Appropriate serving sizes for carbohydrates and proteins  o Incorporating protein at each meal and snack  o Maintain a three day food diary and bring to class 2  Â· Report blood glucose levels to the [address] [place] [address]'s Maternal-Fetal Medicine office weekly or as directed:  o Phone : [phone]  o Fax: [phone]  o Email: [email or web address]    The patient is scheduled to attend class 2 on Thursday, 17  Additionally, fetal ultrasound evaluation by the Perinatologist has been scheduled to assure continuity of care  Please contact the Diabetes and Pregnancy Program at [phone] if you have any questions   Time spent with patient [id number]; time spent face to face counseling greater than 50% of the appointment  Sincerely,     Sabino Gutierrez MS, RD, CDE, LDN  Diabetes Educator   Diabetes and Pregnancy Program          Future Appointments    Date/Time Provider Specialty Site   02/27/2017 04:00 PM JOHNNY Franklin   Obstetrics/Gynecology [facility] OB/GYN  [place]   Electronically signed by : Wally Prasad RD; Feb 16 2017 12:04PM EST                       (Author)    Electronically signed by : Latisha Mercer; Feb 16 2017  2:11PM EST                       (Author)

## 2018-01-17 NOTE — PROGRESS NOTES
2016         RE: Shaw Mckay                                        To: Stephaniema JOHNNY Key    MR#: [id number]                                  [address]   : [address]:                                              Jag Marcano U  6    (Exam #: [id number])                           Fax: [phone]      The LMP of this 31year old,  G1, P0-0-0-0 patient was 2016, giving   her an SALVATORE of APR 10 2017 and a current gestational age of 25 weeks 1 day   by dates  A sonographic examination was performed on 2016 using   real time equipment  The ultrasound examination was performed using   abdominal & vaginal techniques  The patient has a BMI of 29 6  Her blood   pressure today was 112/69  Earliest ultrasound found in her record: 16   9w2d  4/15/17 SALVATORE      Hiwot Fitzpatrick has no complaints today  She reports fetal movements and denies vaginal   bleeding  Her recently completed Sequential Screen revealed a Down   syndrome risk of 1:2,500, Trisomy 18 risk of 1:10,000, and ONTD risk of   1:6,000  Evaluation of the individual analyte levels reveals no increased   risk for abnormal placentation  Early gestational diabetes screening was   abnormal, with an elevated one-hour post Glucola value of 153 mg/dL on     A diagnostic three-hour glucose tolerance test performed   earlier today revealed 4 out of 4 normal values        Cardiac motion was observed at 153 bpm       INDICATIONS      fetal anatomical survey      Exam Types      LEVEL II   Transvaginal      RESULTS      Fetus # 1 of 1   Vertex presentation   Placenta Location = Anterior, fundal   No placenta previa      MEASUREMENTS (* Included In Average GA)      AC              14 3 cm        19 weeks 2 days* (38%)   BPD              4 9 cm        21 weeks 0 days* (76%)   HC              18 3 cm        20 weeks 4 days* (63%)   Femur            3 3 cm        20 weeks 4 days* (53%)      Nuchal Fold      3 4 mm      Humerus          2 9 cm        19 weeks 3 days  (41%)      Cerebellum       2 1 cm        20 weeks 4 days   Biorbit          3 0 cm        19 weeks 3 days   CisternaMagna    3 5 mm      HC/AC           1 28   FL/AC           0 23   FL/BPD          0 68   EFW (Ac/Fl/Hc)   331 grams - 0 lbs 12 oz      THE AVERAGE GESTATIONAL AGE is 20 weeks 3 days +/- 10 days  AMNIOTIC FLUID         Largest Vertical Pocket = 4 3 cm   Amniotic Fluid: Normal      CERVICAL EVALUATION      The cervix appeared normal (Ultrasound Examination)  SUPINE      Cervical Length: 3 60 cm      OTHER TEST RESULTS           Funneling?: No             Dynamic Changes?: No        Resp  To TFP?: No      ANATOMY      Head                                    Normal   Face/Neck                               Normal   Th  Cav  Normal   Heart                                   Normal   Abd  Cav  Normal   Stomach                                 Normal   Right Kidney                            Normal   Left Kidney                             Normal   Bladder                                 Normal   Abd  Wall                               Normal   Spine                                   Normal   Extrems                                 Normal   Genitalia                               Normal   Placenta                                Normal   Umbl  Cord                              Normal   Uterus                                  Normal   PCI                                     Normal      ANATOMY DETAILS      Visualized Appearing Sonographically Normal:   HEAD: (Calvarium, BPD Level, Cavum, Lateral Ventricles, Choroid Plexus,   Cerebellum, Cisterna Magna);    FACE/NECK: (Neck, Nuchal Fold, Profile,   Orbits, Nose/Lips, Palate, Face);    TH  CAV  : (Lungs, Diaphragm);       HEART: (Four Chamber View, Proximal Left Outflow, Proximal Right Outflow,   3VV, 3 Vessel Trachea, Short Axis of Greater Vessels, Ductal Arch, Aortic   Arch, Interventricular Septum, Interatrial Septum, IVC, SVC, Cardiac Axis,   Cardiac Position);    ABD  CAV : (Liver);    STOMACH, RIGHT KIDNEY, LEFT   KIDNEY, BLADDER, ABD  WALL, SPINE: (Cervical Spine, Thoracic Spine, Lumbar   Spine, Sacrum);    EXTREMS: (Lt Humerus, Rt Humerus, Lt Forearm, Rt   Forearm, Lt Hand, Rt Hand, Lt Femur, Rt Femur, Lt Low Leg, Rt Low Leg, Lt   Foot, Rt Foot);    GENITALIA (Male), PLACENTA, UMBL  CORD, UTERUS, PCI      ADNEXA      The left ovary appeared normal and measured 2 4 x 2 1 x 2 6 cm with a   volume of 6 9 cc  The right ovary appeared normal and measured 3 4 x 2 0 x   2 4 cm with a volume of 8 5 cc  IMPRESSION      Renteria IUP   20 weeks and 3 days by this ultrasound  (SALVATORE=APR 8 2017)   Vertex presentation   Normal anatomy survey   Regular fetal heart rate of 153 bpm   Anterior, fundal placenta   No placenta previa      GENERAL COMMENT      No fetal structural abnormality or ultrasound marker for aneuploidy is   identified on the Level II ultrasound study today  Fetal growth and   amniotic fluid volume are normal   The placenta is normal in appearance  The cervix is normal in appearance by transvaginal sonography  The   cervical length is normal   Cervical debris is not present  Cervical   funneling is not present  Neither provocative nor dynamic change is   appreciated  Today's ultrasound findings and suggested follow-up were discussed in   detail with Bulgarian  We discussed that prenatal ultrasound cannot rule out all   congenital abnormalities  Her Sequential Screen results were discussed in   detail  No further appointment has been scheduled in the [facility], [facility]    for Bulgarian at this time  Follow-up [facility] ultrasound evaluation is recommended   as clinically indicated        The face to face time, in addition to time spent discussing ultrasound   results, was approximately 10 minutes, greater than 50% of which was spent   during counseling and coordination of care  RANDI Sanders M D     Maternal-Fetal Medicine   Electronically signed 11/22/16 18:25

## 2018-01-17 NOTE — MISCELLANEOUS
Message  Return to work or school:   Dileep Hill is under my professional care  She was seen in my office on 03/17/2017       Patient's due date is 4/10/17  Due to complications with pregnancy there is a likelihood that she may deliver prior to this date  Should you have any questions, please do not hesitate to contact me at [phone]  Roberto COLES/pete  Signatures   Electronically signed by :  SANKET Reddy; Mar 17 2017  3:28PM EST                       (Author)

## 2018-01-18 NOTE — RESULT NOTES
Verified Results  (1) GLUCOSE TOLERANCE TEST, 3HR [id number] 10:59AM Heidi Ascencio    Order Number: [id number]     Test Name Result Flag Reference   GLUCOSE TOLERANCE FASTING 96 mg/dL  65-99   GLUCOSE 1 HOUR 100 GM GLUC CHALLENGE-PREG  mg/dL H    GLUCOSE 2 HOUR 100 GM GLUC CHALLENGE-PREG  mg/dL     GLUCOSE, 3HR (100gm Gluc Challenge Preg-Pts) 143 mg/dL H

## 2018-01-22 VITALS
WEIGHT: 201.25 LBS | BODY MASS INDEX: 31.59 KG/M2 | SYSTOLIC BLOOD PRESSURE: 108 MMHG | HEIGHT: 67 IN | DIASTOLIC BLOOD PRESSURE: 60 MMHG

## 2018-01-22 VITALS
WEIGHT: 195.38 LBS | SYSTOLIC BLOOD PRESSURE: 106 MMHG | OXYGEN SATURATION: 98 % | DIASTOLIC BLOOD PRESSURE: 62 MMHG | HEIGHT: 67 IN | BODY MASS INDEX: 30.66 KG/M2

## 2018-01-22 VITALS
OXYGEN SATURATION: 98 % | WEIGHT: 198.25 LBS | HEART RATE: 96 BPM | DIASTOLIC BLOOD PRESSURE: 76 MMHG | BODY MASS INDEX: 31.11 KG/M2 | SYSTOLIC BLOOD PRESSURE: 112 MMHG | HEIGHT: 67 IN

## 2018-01-22 VITALS
BODY MASS INDEX: 27.51 KG/M2 | DIASTOLIC BLOOD PRESSURE: 62 MMHG | SYSTOLIC BLOOD PRESSURE: 114 MMHG | HEIGHT: 67 IN | WEIGHT: 175.25 LBS

## 2018-01-22 VITALS
SYSTOLIC BLOOD PRESSURE: 118 MMHG | DIASTOLIC BLOOD PRESSURE: 52 MMHG | WEIGHT: 198.25 LBS | BODY MASS INDEX: 31.11 KG/M2 | OXYGEN SATURATION: 98 % | HEIGHT: 67 IN

## 2018-01-22 VITALS
HEIGHT: 67 IN | SYSTOLIC BLOOD PRESSURE: 100 MMHG | BODY MASS INDEX: 30.29 KG/M2 | HEART RATE: 99 BPM | DIASTOLIC BLOOD PRESSURE: 60 MMHG | WEIGHT: 193 LBS

## 2018-03-07 NOTE — PROGRESS NOTES
Education  [employer] Education 1st Trimester:   First Trimester Education provided:   benefits of breastfeeding, importance of exclusive breastfeeding, early initiation of breastfeeding and exclusive breastfeeding for the first 6 months   The patient is planning on breastfeeding  Prenatal education provided by: Grace CIFUENTES   [employer] Education 2nd Trimester:   Second Trimester Education provided:   benefits of breastfeeding, importance of exclusive breastfeeding, early initiation of breastfeeding, exclusive breastfeeding for the first 6 months, non-pharmacologic pain relief methods for labor, rooming-in on 24 hour basis, Pregnancy Essentials Reference Guide given and 28 week packet given  Mother has not registered for prenatal class  Thought has not been given to selecting a pediatrician  The mother has not discussed personal preferences with her provider  Prenatal education provided by: Grace CIFUENTES   [employer] Education 3rd Trimester: Third Trimester Education provided:   benefits of breastfeeding, importance of exclusive breastfeeding, early initiation of breastfeeding, exclusive breastfeeding for the first 6 months, frequent feedings for optimal milk production, feeding on demand/baby-led feedings, effective positioning and attachment, importance of breastfeeding after 6 months following introduction of other foods, non-pharmacologic pain relief methods for labor, importance of early skin-to-skin contact and 28 week packet given  rooming-in on 24 hour basis Pregnancy Essentials Reference Guide given   The patient is planning on breastfeeding  The patient is planning on exclusively breastfeeding until the baby is 10months of age  Mother has registered for prenatal class  Thought has not been given to selecting a pediatrician  The mother has not discussed personal preferences with her provider  Prenatal education provided by:  Grace Saunders Electronically signed by : JOHNNY Curry ; Jan 26 2017  1:23PM EST                       (Author)